# Patient Record
Sex: MALE | Race: BLACK OR AFRICAN AMERICAN | NOT HISPANIC OR LATINO | URBAN - METROPOLITAN AREA
[De-identification: names, ages, dates, MRNs, and addresses within clinical notes are randomized per-mention and may not be internally consistent; named-entity substitution may affect disease eponyms.]

---

## 2019-07-20 ENCOUNTER — INPATIENT (INPATIENT)
Facility: HOSPITAL | Age: 34
LOS: 4 days | Discharge: HOME | DRG: 682 | End: 2019-07-25
Attending: INTERNAL MEDICINE | Admitting: INTERNAL MEDICINE
Payer: MEDICARE

## 2019-07-20 VITALS
HEART RATE: 119 BPM | TEMPERATURE: 98 F | OXYGEN SATURATION: 96 % | SYSTOLIC BLOOD PRESSURE: 160 MMHG | RESPIRATION RATE: 18 BRPM | WEIGHT: 179.9 LBS | DIASTOLIC BLOOD PRESSURE: 116 MMHG

## 2019-07-20 LAB
ALBUMIN SERPL ELPH-MCNC: 3.9 G/DL — SIGNIFICANT CHANGE UP (ref 3.4–5)
ALP SERPL-CCNC: 94 U/L — SIGNIFICANT CHANGE UP (ref 40–120)
ALT FLD-CCNC: 43 U/L — HIGH (ref 12–42)
ANION GAP SERPL CALC-SCNC: 15 MMOL/L — SIGNIFICANT CHANGE UP (ref 9–16)
ANION GAP SERPL CALC-SCNC: 20 MMOL/L — HIGH (ref 9–16)
APAP SERPL-MCNC: <2 UG/ML — LOW (ref 10–30)
APPEARANCE UR: CLEAR — SIGNIFICANT CHANGE UP
APTT BLD: 34.4 SEC — SIGNIFICANT CHANGE UP (ref 27.5–36.3)
AST SERPL-CCNC: 44 U/L — HIGH (ref 15–37)
BASOPHILS NFR BLD AUTO: 0.3 % — SIGNIFICANT CHANGE UP (ref 0–2)
BILIRUB SERPL-MCNC: 0.8 MG/DL — SIGNIFICANT CHANGE UP (ref 0.2–1.2)
BILIRUB UR-MCNC: ABNORMAL
BUN SERPL-MCNC: 36 MG/DL — HIGH (ref 7–23)
BUN SERPL-MCNC: 46 MG/DL — HIGH (ref 7–23)
CALCIUM SERPL-MCNC: 7.4 MG/DL — LOW (ref 8.5–10.5)
CALCIUM SERPL-MCNC: 9.8 MG/DL — SIGNIFICANT CHANGE UP (ref 8.5–10.5)
CHLORIDE SERPL-SCNC: 107 MMOL/L — SIGNIFICANT CHANGE UP (ref 96–108)
CHLORIDE SERPL-SCNC: 99 MMOL/L — SIGNIFICANT CHANGE UP (ref 96–108)
CK SERPL-CCNC: 535 U/L — HIGH (ref 39–308)
CO2 SERPL-SCNC: 19 MMOL/L — LOW (ref 22–31)
CO2 SERPL-SCNC: 20 MMOL/L — LOW (ref 22–31)
COLOR SPEC: YELLOW — SIGNIFICANT CHANGE UP
CREAT SERPL-MCNC: 1.6 MG/DL — HIGH (ref 0.5–1.3)
CREAT SERPL-MCNC: 2.38 MG/DL — HIGH (ref 0.5–1.3)
DIFF PNL FLD: ABNORMAL
EOSINOPHIL NFR BLD AUTO: 0.1 % — SIGNIFICANT CHANGE UP (ref 0–6)
ETHANOL SERPL-MCNC: <3 MG/DL — SIGNIFICANT CHANGE UP
GLUCOSE SERPL-MCNC: 147 MG/DL — HIGH (ref 70–99)
GLUCOSE SERPL-MCNC: 84 MG/DL — SIGNIFICANT CHANGE UP (ref 70–99)
GLUCOSE UR QL: NEGATIVE — SIGNIFICANT CHANGE UP
HCT VFR BLD CALC: 39.6 % — SIGNIFICANT CHANGE UP (ref 39–50)
HGB BLD-MCNC: 13.6 G/DL — SIGNIFICANT CHANGE UP (ref 13–17)
IMM GRANULOCYTES NFR BLD AUTO: 0.2 % — SIGNIFICANT CHANGE UP (ref 0–1.5)
INR BLD: 1.22 — HIGH (ref 0.88–1.16)
KETONES UR-MCNC: NEGATIVE — SIGNIFICANT CHANGE UP
LEUKOCYTE ESTERASE UR-ACNC: NEGATIVE — SIGNIFICANT CHANGE UP
LYMPHOCYTES # BLD AUTO: 6.8 % — LOW (ref 13–44)
MAGNESIUM SERPL-MCNC: 3.5 MG/DL — HIGH (ref 1.6–2.6)
MCHC RBC-ENTMCNC: 30.4 PG — SIGNIFICANT CHANGE UP (ref 27–34)
MCHC RBC-ENTMCNC: 34.3 G/DL — SIGNIFICANT CHANGE UP (ref 32–36)
MCV RBC AUTO: 88.6 FL — SIGNIFICANT CHANGE UP (ref 80–100)
MONOCYTES NFR BLD AUTO: 10.6 % — SIGNIFICANT CHANGE UP (ref 2–14)
NEUTROPHILS NFR BLD AUTO: 82 % — HIGH (ref 43–77)
NITRITE UR-MCNC: NEGATIVE — SIGNIFICANT CHANGE UP
PCP SPEC-MCNC: SIGNIFICANT CHANGE UP
PH UR: 6 — SIGNIFICANT CHANGE UP (ref 5–8)
PLATELET # BLD AUTO: 290 K/UL — SIGNIFICANT CHANGE UP (ref 150–400)
POTASSIUM SERPL-MCNC: 3.3 MMOL/L — LOW (ref 3.5–5.3)
POTASSIUM SERPL-MCNC: 4.2 MMOL/L — SIGNIFICANT CHANGE UP (ref 3.5–5.3)
POTASSIUM SERPL-SCNC: 3.3 MMOL/L — LOW (ref 3.5–5.3)
POTASSIUM SERPL-SCNC: 4.2 MMOL/L — SIGNIFICANT CHANGE UP (ref 3.5–5.3)
PROT SERPL-MCNC: 9.2 G/DL — HIGH (ref 6.4–8.2)
PROT UR-MCNC: 30 MG/DL
PROTHROM AB SERPL-ACNC: 13.6 SEC — HIGH (ref 10–12.9)
RBC # BLD: 4.47 M/UL — SIGNIFICANT CHANGE UP (ref 4.2–5.8)
RBC # FLD: 12.9 % — SIGNIFICANT CHANGE UP (ref 10.3–14.5)
SALICYLATES SERPL-MCNC: 1.8 MG/DL — LOW (ref 2.8–20)
SODIUM SERPL-SCNC: 139 MMOL/L — SIGNIFICANT CHANGE UP (ref 132–145)
SODIUM SERPL-SCNC: 141 MMOL/L — SIGNIFICANT CHANGE UP (ref 132–145)
SP GR SPEC: 1.02 — SIGNIFICANT CHANGE UP (ref 1–1.03)
TROPONIN I SERPL-MCNC: <0.017 NG/ML — LOW (ref 0.02–0.06)
TROPONIN I SERPL-MCNC: <0.017 NG/ML — LOW (ref 0.02–0.06)
TSH SERPL-MCNC: 1.09 UIU/ML — SIGNIFICANT CHANGE UP (ref 0.36–3.74)
UROBILINOGEN FLD QL: 4 E.U./DL
WBC # BLD: 10.6 K/UL — HIGH (ref 3.8–10.5)
WBC # FLD AUTO: 10.6 K/UL — HIGH (ref 3.8–10.5)

## 2019-07-20 PROCEDURE — 93010 ELECTROCARDIOGRAM REPORT: CPT

## 2019-07-20 PROCEDURE — 70450 CT HEAD/BRAIN W/O DYE: CPT | Mod: 26

## 2019-07-20 PROCEDURE — 99220: CPT | Mod: 25

## 2019-07-20 PROCEDURE — 71045 X-RAY EXAM CHEST 1 VIEW: CPT | Mod: 26

## 2019-07-20 PROCEDURE — 36000 PLACE NEEDLE IN VEIN: CPT

## 2019-07-20 RX ORDER — SODIUM CHLORIDE 9 MG/ML
1000 INJECTION, SOLUTION INTRAVENOUS
Refills: 0 | Status: DISCONTINUED | OUTPATIENT
Start: 2019-07-20 | End: 2019-07-21

## 2019-07-20 RX ORDER — SODIUM CHLORIDE 9 MG/ML
2000 INJECTION INTRAMUSCULAR; INTRAVENOUS; SUBCUTANEOUS ONCE
Refills: 0 | Status: COMPLETED | OUTPATIENT
Start: 2019-07-20 | End: 2019-07-20

## 2019-07-20 RX ORDER — SODIUM CHLORIDE 9 MG/ML
1000 INJECTION INTRAMUSCULAR; INTRAVENOUS; SUBCUTANEOUS ONCE
Refills: 0 | Status: COMPLETED | OUTPATIENT
Start: 2019-07-20 | End: 2019-07-20

## 2019-07-20 RX ORDER — HYDRALAZINE HCL 50 MG
50 TABLET ORAL ONCE
Refills: 0 | Status: COMPLETED | OUTPATIENT
Start: 2019-07-20 | End: 2019-07-20

## 2019-07-20 RX ORDER — MIDAZOLAM HYDROCHLORIDE 1 MG/ML
5 INJECTION, SOLUTION INTRAMUSCULAR; INTRAVENOUS ONCE
Refills: 0 | Status: DISCONTINUED | OUTPATIENT
Start: 2019-07-20 | End: 2019-07-20

## 2019-07-20 RX ADMIN — SODIUM CHLORIDE 125 MILLILITER(S): 9 INJECTION, SOLUTION INTRAVENOUS at 23:32

## 2019-07-20 RX ADMIN — MIDAZOLAM HYDROCHLORIDE 5 MILLIGRAM(S): 1 INJECTION, SOLUTION INTRAMUSCULAR; INTRAVENOUS at 23:05

## 2019-07-20 RX ADMIN — Medication 50 MILLIGRAM(S): at 22:56

## 2019-07-20 RX ADMIN — SODIUM CHLORIDE 1000 MILLILITER(S): 9 INJECTION INTRAMUSCULAR; INTRAVENOUS; SUBCUTANEOUS at 19:34

## 2019-07-20 RX ADMIN — SODIUM CHLORIDE 1000 MILLILITER(S): 9 INJECTION INTRAMUSCULAR; INTRAVENOUS; SUBCUTANEOUS at 19:46

## 2019-07-20 RX ADMIN — SODIUM CHLORIDE 2000 MILLILITER(S): 9 INJECTION INTRAMUSCULAR; INTRAVENOUS; SUBCUTANEOUS at 22:00

## 2019-07-20 RX ADMIN — SODIUM CHLORIDE 1000 MILLILITER(S): 9 INJECTION INTRAMUSCULAR; INTRAVENOUS; SUBCUTANEOUS at 18:46

## 2019-07-20 NOTE — ED ADULT TRIAGE NOTE - CHIEF COMPLAINT QUOTE
patient was found wandering into a hotel lobby "acting weird, confused" pt with history is psychiatric problems, dm and htn as per EMS. FS in the field 295. Pt is awake and alert with no obvious injury or trauma- unknown onset of time

## 2019-07-20 NOTE — ED ADULT NURSE NOTE - NSIMPLEMENTINTERV_GEN_ALL_ED
Implemented All Fall Risk Interventions:  [place] to call system. Call bell, personal items and telephone within reach. Instruct patient to call for assistance. Room bathroom lighting operational. Non-slip footwear when patient is off stretcher. Physically safe environment: no spills, clutter or unnecessary equipment. Stretcher in lowest position, wheels locked, appropriate side rails in place. Provide visual cue, wrist band, yellow gown, etc. Monitor gait and stability. Monitor for mental status changes and reorient to person, place, and time. Review medications for side effects contributing to fall risk. Reinforce activity limits and safety measures with patient and family.

## 2019-07-20 NOTE — ED ADULT NURSE NOTE - OBJECTIVE STATEMENT
brought in by EMS for AMS and high blood sugar; tartive diskinesthia noted; known psych history but denies taking any medication, drug or alcohol use; denies any pain or have any medical complaints

## 2019-07-20 NOTE — ED PROVIDER NOTE - PHYSICAL EXAMINATION
VITAL SIGNS: I have reviewed nursing notes and confirm.  CONSTITUTIONAL: +Lip smacking c/w tardive dyskinesia.   SKIN: Skin is warm and dry, no acute rash, lacerations, abrasions, or bruising.   HEAD: Normocephalic; atraumatic.  EYES: PERRL, EOM intact; conjunctiva and sclera clear.  ENT: No nasal discharge; airway clear.  NECK: Supple; non tender.  CARD: +Tachycardia, no murmurs. Regular.   RESP: No wheezes, rales or rhonchi.  ABD: Normal bowel sounds; soft; non-distended; non-tender; no hepatosplenomegaly.  MSK: Normal ROM. No clubbing, cyanosis or edema.  NEURO: Alert but unable to assess orientation.  Moving all 4 extremities equally.   PSYCH: See HPI

## 2019-07-20 NOTE — ED CDU PROVIDER INITIAL DAY NOTE - PROGRESS NOTE DETAILS
Case signed out to Dr. Whitney at midnight. Pt resting comfortably.  Noted elevated BUN/Cr.  Will administer 3L IV hydration and re-evaluate and re-do labs.

## 2019-07-20 NOTE — ED PROVIDER NOTE - PMH
Diabetes mellitus    Psychiatric disorder Diabetes mellitus    Essential hypertension    Psychiatric disorder

## 2019-07-20 NOTE — ED PROVIDER NOTE - PROGRESS NOTE DETAILS
Pt taken to CT and all of the sudden became verbal and refused studies.  Now speaking and denies complaints reporting he's "fine."  Refusing any radiologic study.  Will observe while we await blood tests and re-evaluate.  Lip smacking also abated. Pt resting comfortably.  Noted elevated BUN/Cr.  Will administer 3L IV hydration and re-evaluate and re-do labs. BUN/Cr improved with fluid.  BS normal.  pt is still not giving full hx.  Will place on maintenance fluid and re-order HCT and CXR.  Will treat HTN.

## 2019-07-20 NOTE — ED PROVIDER NOTE - OBJECTIVE STATEMENT
Hx obtained from EMS as pt is alert and will make eye contact and follow directions but he is not answering questions after multiple attempts.  EMS reports h/o DM and psychiatric d/o as per their EMR.  They were called by a hotel after staff noted that pt wondered in from street and was acting in a bizarre fashion.  They deny any witnessed trauma or injuries.  Pt not answering questions for them either.

## 2019-07-21 DIAGNOSIS — R63.8 OTHER SYMPTOMS AND SIGNS CONCERNING FOOD AND FLUID INTAKE: ICD-10-CM

## 2019-07-21 DIAGNOSIS — F99 MENTAL DISORDER, NOT OTHERWISE SPECIFIED: ICD-10-CM

## 2019-07-21 DIAGNOSIS — F05 DELIRIUM DUE TO KNOWN PHYSIOLOGICAL CONDITION: ICD-10-CM

## 2019-07-21 DIAGNOSIS — Z91.89 OTHER SPECIFIED PERSONAL RISK FACTORS, NOT ELSEWHERE CLASSIFIED: ICD-10-CM

## 2019-07-21 DIAGNOSIS — E87.6 HYPOKALEMIA: ICD-10-CM

## 2019-07-21 DIAGNOSIS — I16.1 HYPERTENSIVE EMERGENCY: ICD-10-CM

## 2019-07-21 DIAGNOSIS — N17.9 ACUTE KIDNEY FAILURE, UNSPECIFIED: ICD-10-CM

## 2019-07-21 DIAGNOSIS — E11.9 TYPE 2 DIABETES MELLITUS WITHOUT COMPLICATIONS: ICD-10-CM

## 2019-07-21 DIAGNOSIS — R41.82 ALTERED MENTAL STATUS, UNSPECIFIED: ICD-10-CM

## 2019-07-21 LAB
ALBUMIN SERPL ELPH-MCNC: 3.3 G/DL — SIGNIFICANT CHANGE UP (ref 3.3–5)
ALP SERPL-CCNC: 69 U/L — SIGNIFICANT CHANGE UP (ref 40–120)
ALT FLD-CCNC: 30 U/L — SIGNIFICANT CHANGE UP (ref 10–45)
ANION GAP SERPL CALC-SCNC: 12 MMOL/L — SIGNIFICANT CHANGE UP (ref 9–16)
ANION GAP SERPL CALC-SCNC: 14 MMOL/L — SIGNIFICANT CHANGE UP (ref 5–17)
AST SERPL-CCNC: 26 U/L — SIGNIFICANT CHANGE UP (ref 10–40)
BILIRUB DIRECT SERPL-MCNC: <0.2 MG/DL — SIGNIFICANT CHANGE UP (ref 0–0.2)
BILIRUB INDIRECT FLD-MCNC: >0.3 MG/DL — SIGNIFICANT CHANGE UP (ref 0.2–1)
BILIRUB SERPL-MCNC: 0.5 MG/DL — SIGNIFICANT CHANGE UP (ref 0.2–1.2)
BUN SERPL-MCNC: 20 MG/DL — SIGNIFICANT CHANGE UP (ref 7–23)
BUN SERPL-MCNC: 30 MG/DL — HIGH (ref 7–23)
CALCIUM SERPL-MCNC: 8.2 MG/DL — LOW (ref 8.5–10.5)
CALCIUM SERPL-MCNC: 8.4 MG/DL — SIGNIFICANT CHANGE UP (ref 8.4–10.5)
CHLORIDE SERPL-SCNC: 105 MMOL/L — SIGNIFICANT CHANGE UP (ref 96–108)
CHLORIDE SERPL-SCNC: 109 MMOL/L — HIGH (ref 96–108)
CK SERPL-CCNC: 298 U/L — HIGH (ref 30–200)
CO2 SERPL-SCNC: 21 MMOL/L — LOW (ref 22–31)
CO2 SERPL-SCNC: 24 MMOL/L — SIGNIFICANT CHANGE UP (ref 22–31)
CREAT SERPL-MCNC: 1.05 MG/DL — SIGNIFICANT CHANGE UP (ref 0.5–1.3)
CREAT SERPL-MCNC: 1.61 MG/DL — HIGH (ref 0.5–1.3)
GLUCOSE BLDC GLUCOMTR-MCNC: 102 MG/DL — HIGH (ref 70–99)
GLUCOSE BLDC GLUCOMTR-MCNC: 142 MG/DL — HIGH (ref 70–99)
GLUCOSE BLDC GLUCOMTR-MCNC: 85 MG/DL — SIGNIFICANT CHANGE UP (ref 70–99)
GLUCOSE SERPL-MCNC: 108 MG/DL — HIGH (ref 70–99)
GLUCOSE SERPL-MCNC: 119 MG/DL — HIGH (ref 70–99)
HCT VFR BLD CALC: 46.4 % — SIGNIFICANT CHANGE UP (ref 39–50)
HGB BLD-MCNC: 14.5 G/DL — SIGNIFICANT CHANGE UP (ref 13–17)
LACTATE SERPL-SCNC: 2.1 MMOL/L — HIGH (ref 0.4–2)
MAGNESIUM SERPL-MCNC: 2.2 MG/DL — SIGNIFICANT CHANGE UP (ref 1.6–2.6)
MCHC RBC-ENTMCNC: 30 PG — SIGNIFICANT CHANGE UP (ref 27–34)
MCHC RBC-ENTMCNC: 31.3 GM/DL — LOW (ref 32–36)
MCV RBC AUTO: 96.1 FL — SIGNIFICANT CHANGE UP (ref 80–100)
NRBC # BLD: 0 /100 WBCS — SIGNIFICANT CHANGE UP (ref 0–0)
PCO2 BLDV: 52 MMHG — HIGH (ref 41–51)
PH BLDV: 7.32 — SIGNIFICANT CHANGE UP (ref 7.32–7.43)
PHOSPHATE SERPL-MCNC: 2.8 MG/DL — SIGNIFICANT CHANGE UP (ref 2.5–4.5)
PLATELET # BLD AUTO: 290 K/UL — SIGNIFICANT CHANGE UP (ref 150–400)
PO2 BLDV: 30 MMHG — LOW (ref 35–40)
POTASSIUM SERPL-MCNC: 3.7 MMOL/L — SIGNIFICANT CHANGE UP (ref 3.5–5.3)
POTASSIUM SERPL-MCNC: SIGNIFICANT CHANGE UP MMOL/L (ref 3.5–5.3)
POTASSIUM SERPL-SCNC: 3.7 MMOL/L — SIGNIFICANT CHANGE UP (ref 3.5–5.3)
POTASSIUM SERPL-SCNC: SIGNIFICANT CHANGE UP MMOL/L (ref 3.5–5.3)
PROT SERPL-MCNC: 6.4 G/DL — SIGNIFICANT CHANGE UP (ref 6–8.3)
RBC # BLD: 4.83 M/UL — SIGNIFICANT CHANGE UP (ref 4.2–5.8)
RBC # FLD: 13 % — SIGNIFICANT CHANGE UP (ref 10.3–14.5)
SAO2 % BLDV: 47 % — SIGNIFICANT CHANGE UP
SODIUM SERPL-SCNC: 140 MMOL/L — SIGNIFICANT CHANGE UP (ref 135–145)
SODIUM SERPL-SCNC: 145 MMOL/L — SIGNIFICANT CHANGE UP (ref 132–145)
WBC # BLD: 11.6 K/UL — HIGH (ref 3.8–10.5)
WBC # FLD AUTO: 11.6 K/UL — HIGH (ref 3.8–10.5)

## 2019-07-21 PROCEDURE — 99223 1ST HOSP IP/OBS HIGH 75: CPT

## 2019-07-21 PROCEDURE — 99217: CPT

## 2019-07-21 PROCEDURE — 99223 1ST HOSP IP/OBS HIGH 75: CPT | Mod: GC

## 2019-07-21 PROCEDURE — 93010 ELECTROCARDIOGRAM REPORT: CPT

## 2019-07-21 RX ORDER — HYDRALAZINE HCL 50 MG
10 TABLET ORAL EVERY 4 HOURS
Refills: 0 | Status: DISCONTINUED | OUTPATIENT
Start: 2019-07-21 | End: 2019-07-21

## 2019-07-21 RX ORDER — HEPARIN SODIUM 5000 [USP'U]/ML
5000 INJECTION INTRAVENOUS; SUBCUTANEOUS EVERY 8 HOURS
Refills: 0 | Status: DISCONTINUED | OUTPATIENT
Start: 2019-07-21 | End: 2019-07-23

## 2019-07-21 RX ORDER — LABETALOL HCL 100 MG
10 TABLET ORAL ONCE
Refills: 0 | Status: COMPLETED | OUTPATIENT
Start: 2019-07-21 | End: 2019-07-21

## 2019-07-21 RX ORDER — HYDRALAZINE HCL 50 MG
10 TABLET ORAL ONCE
Refills: 0 | Status: COMPLETED | OUTPATIENT
Start: 2019-07-21 | End: 2019-07-21

## 2019-07-21 RX ADMIN — Medication 10 MILLIGRAM(S): at 02:09

## 2019-07-21 RX ADMIN — Medication 1 MILLIGRAM(S): at 11:14

## 2019-07-21 RX ADMIN — Medication 2.5 MILLIGRAM(S): at 05:16

## 2019-07-21 RX ADMIN — Medication 20 MILLIGRAM(S): at 11:15

## 2019-07-21 RX ADMIN — Medication 10 MILLIGRAM(S): at 04:41

## 2019-07-21 NOTE — BEHAVIORAL HEALTH ASSESSMENT NOTE - SUMMARY
The patient is a 33-year old man with a self reported history of schizoaffective disorder, who presented to [facility] from [facility] with altered mental status and labile hypertension. Initially uncooperative, patient's vital signs were treated and he is now admitted for further medical treatment of unstable vital signs and EMILI. Psychiatry consulted to rule out catatonia, after patient was minimally verbal and lethargic this morning. Ativan 1mg IV was given prior to psychiatric assessment.     On examination, the patient is alert, oriented, does not show any echopraxia/echolalia, no posturing, is not mute, is not hyperaroused, is mobile, shows no frontal release signs, is constricted in affect and a bit withdrawn, but shows good eye contact. He is somewhat tangential but not overly disorganized. There is low concern for catatonia as he scores only 2 on the Don-Brandon Catatonia rating scale. There is also low concern for neuroleptic malignant syndrome at this time, as his WBC and CPK are not severely elevated and he is afebrile. He has no rigidity on examination, and has also been noncompliant with neuroleptic medication.     It is possible that the patient's presentation this morning was a mild form of catatonia, which responded well to ativan, although it is less likely. He may have been exhibiting signs of a hypoactive delirium, evident by lethargy, inattentiveness, hypoarousal, and disorientation. This delirium may have been due to multiple etiologies. It is also possible that the patient may have been in alcohol withdrawal, with abnormal vital signs and altered mental status, which has improved with ativan.     The patient does have a psychiatric history, and a clear substance use disorder. While he does not meet criteria for inpatient psychiatric hospitalization at this time, he would benefit from outpatient referrals and treatment.    At this time, he is not acutely psychotic, not acutely suicidal, and fairly well oriented. He is, however, inattentive, mildly confused, and mildly lethargic, pointing most towards a hypoactive delirium. His psychiatric symptoms are also likely exacerbated by heavy substance use. While patient is still in the hospital, would monitor for s/s of alcohol withdrawal using CIWA, and treat as appropriate.     Plan:   1. Patient does NOT need 1:1  2. Patient does not require inpatient hospitalization  3. Would continue to treat unstable vital signs, encourage adequate PO hydration  4. Hold neuroleptic medications at this time due to concern for catatonia  5. Keep blinds open during day, frequent reorientation techniques, clock visible in room  6. Monitor for alcohol withdrawal  7. Would enlist help of Social Work for shelter referral, community resources    Patient does not have any psychiatric contraindications to discharge. Should he be medically cleared, patient should be given number [phone] include [facility], a free hot[facility] that gives resources to those in need of mental health.     For outpatient psychiatric follow up, patient can go to [facility] mental health clinic on [address], any day at 8:30am to establish psychiatric care. Patient can also follow up at [facility], which provides dual diagnosis mental health care.

## 2019-07-21 NOTE — PROGRESS NOTE ADULT - ATTENDING COMMENTS
33-year old man with a self reported history of schizoaffective disorder with uncontrolled hypertension with EMILI (improved with hydration) with acute encephalopathy due to possible catatonia (improved with ativan). History of alcohol abuse. Plan for CIWA protocol. Stable for now. Psychiatry to follow. May be transferred to floor.

## 2019-07-21 NOTE — H&P ADULT - PROBLEM SELECTOR PLAN 7
1) PCP Contacted on Admission: (Y/N) --> Name & Phone #:  2) Date of Contact with PCP:  3) PCP Contacted at Discharge: (Y/N)  4) Summary of Handoff Given to PCP:   5) Post-Discharge Appointment Date and Location: Fluids: D5   Electrolytes: Replete as necessary   Nutrition:  GI:  DVT:  Glucose:  Bowel:  Pain:  Dispo: [facility]  Code: Full

## 2019-07-21 NOTE — PROGRESS NOTE ADULT - PROBLEM SELECTOR PLAN 3
AMS likely 2/2 to psychiatric state vs. neuro pathology.   - control BP management as below  - does not meet sepsis criteria   - UTox negative   - Psychiatry on board. Appreciate recs. No neuroleptics now.

## 2019-07-21 NOTE — BEHAVIORAL HEALTH ASSESSMENT NOTE - DIFFERENTIAL
Delirium, hypoactive state, due to multiple etiologies  Alcohol use Disorder  r/o Catatonia  r/o NMS

## 2019-07-21 NOTE — H&P ADULT - ASSESSMENT
32 y/o male with unknown psychiatric history brought in by EMS for AMS and hypertensive emrgency with labile hypertension (160-200/112-116), unresponsive to PO and IV hydralzine.

## 2019-07-21 NOTE — PROGRESS NOTE ADULT - PROBLEM SELECTOR PLAN 1
Unresponsive HTN with EMILI and AMS. -BP reportedly in 200s (highest documented 181/125) with EMILI and AMS. s/p Hydralazine 50mg PO x 1, and Hydralazine 10mg IV push x 1  - trops negative, EKG   - treat with prn dose of IV anti-hypertensives (Enalapril, labetalol hydralazine)   - f/u EKG  - obtain formal echocardiogram  - Enalapril 20mg QD with good response. Last  / 85

## 2019-07-21 NOTE — H&P ADULT - PROBLEM SELECTOR PLAN 4
On presentation BUN/Cr 46/2.38, slowly trending down s/p 2L NS and 1 L D5/0.5NS On presentation BUN/Cr 46/2.38, slowly trending down s/p 2L NS and 1 L D5/0.5NS. Currently 30/1/61. Likely to resolve with fluids and appropriate BP control.   -c/w NS   -replete K, as necessary  -repeat Utox in AM On presentation BUN/Cr 46/2.38, slowly trending down s/p 3L NS and maintenance D5/0.5NS. Currently 30/1.61. Likely to resolve with fluids and appropriate BP control.   - consider weaning off fluids when intaking PO  - f/u AM labs

## 2019-07-21 NOTE — PROGRESS NOTE ADULT - SUBJECTIVE AND OBJECTIVE BOX
Hospital course:  34yo man with unknown PMHx presented to ED with hypertensive emergency and AMS. In ED patient was given 50mg hydralazine, 10mg labetalol. On 7LA patient was given 20mg Enalpril standing order with good response in BP. Also, catatonia was entertained as possible reason for AMS. 1mg Ativan IV push was given and patient became responsive almost immediately afterwards. Psychiatry consulted and revealed patient with history of schizoaffective disorder and recs CIWA protocol, hold off neuroleptics in light of possible catatonia, and safe for discharge. The patient deemed safe for transfer to regional medical floor.       Interval / Overnight:  Patient was admitted overnight 2 to AMS and hypertensive emergency. When I spoke with Mr. Francis this morning he was minimally responsive, but appeared alert. The one time he did speak he said "I don't know if this is reality or not".     VITAL SIGNS:  Vital Signs Last 24 Hrs  T(C): 36.6 (2019 14:26), Max: 37.5 (2019 19:42)  T(F): 97.9 (2019 14:26), Max: 99.5 (2019 19:42)  HR: 72 (2019 17:18) (72 - 119)  BP: 147/85 (2019 17:18) (127/75 - 193/116)  BP(mean): 107 (2019 17:18) (96 - 140)  RR: 16 (2019 17:18) (16 - 18)  SpO2: 98% (2019 17:18) (95% - 100%)    PHYSICAL EXAM:    General: in NAD, staring vacantly  HEENT: normocephalic, atraumatic; PERRL, anicteric sclera; MMM. Poor dentition. Right lower lip lesion, crusted.   Neck: supple, no thyromegaly, no lymphadenopathy  Cardiovascular: +S1/S2, RRR, no M/G/R  Respiratory: clear to auscultation B/L; no wheezing, no rales, no rhonchi  Gastrointestinal: soft, NT/ND; +BSx4, no organomegaly  Extremities: WWP; no edema, clubbing or cyanosis  Vascular: 2+ radial, DP/PT pulses B/L  Neurological: patient was unresponsive to questions. Pupils were equal and reactive. Extraoccular eye movements were of full range and symmetric. Ptosis of right eyelid.   psych: Patient is unresponsive. Blinking rapidly. Malodorous and appears confused / vacant.      MEDICATIONS:  MEDICATIONS  (STANDING):  enalapril 20 milliGRAM(s) Oral daily  enalaprilat Injectable 2.5 milliGRAM(s) IV Push every 6 hours  heparin  Injectable 5000 Unit(s) SubCutaneous every 8 hours    MEDICATIONS  (PRN):  LORazepam   Injectable 2 milliGRAM(s) IV Push every 2 hours PRN CIWA-Ar score increase by 2 points and a total score of 7 or less      ALLERGIES:  Allergies    No Known Allergies    Intolerances        LABS:                        14.5   11.60 )-----------( 290      ( 2019 11:06 )             46.4     07-    140  |  105  |  20  ----------------------------<  108<H>  see note   |  21<L>  |  1.05    Ca    8.4      2019 11:06  Phos  2.8     -  Mg     2.2         TPro  6.4  /  Alb  3.3  /  TBili  0.5  /  DBili  <0.2  /  AST  26  /  ALT  30  /  AlkPhos  69  07-    PT/INR - ( 2019 18:45 )   PT: 13.6 sec;   INR: 1.22          PTT - ( 2019 18:45 )  PTT:34.4 sec  Urinalysis Basic - ( 2019 23:18 )    Color: Yellow / Appearance: Clear / S.025 / pH: x  Gluc: x / Ketone: NEGATIVE  / Bili: Small / Urobili: 4.0 E.U./dL   Blood: x / Protein: 30 mg/dL / Nitrite: NEGATIVE   Leuk Esterase: NEGATIVE / RBC: < 5 /HPF / WBC < 5 /HPF   Sq Epi: x / Non Sq Epi: x / Bacteria: Many /HPF      CAPILLARY BLOOD GLUCOSE      POCT Blood Glucose.: 85 mg/dL (2019 17:01)      RADIOLOGY & ADDITIONAL TESTS: Reviewed.

## 2019-07-21 NOTE — H&P ADULT - PROBLEM SELECTOR PLAN 2
Patient brought in by EMS for AMS and disruption. Patient refusing to comply in ED or respond to question. Utox neg. CTH neg.   -psych consult in AM   -repeat urine tox   -s/p 3L fluid Possibly secondary to HTN vs unknown substance ingestion vs psychiatric hx.  Patient brought in by EMS for AMS and disruption. Patient refusing to comply in ED or respond to question. Utox neg. CTH neg. Patient unresponsive on admission.   -psych consult in AM   -consider repeat urine tox

## 2019-07-21 NOTE — PROGRESS NOTE ADULT - PROBLEM SELECTOR PLAN 6
Fluids: D5   Electrolytes: Replete as necessary   Nutrition:  GI:  DVT:  Glucose:  Bowel:  Pain:  Dispo: [facility]  Code: Full

## 2019-07-21 NOTE — H&P ADULT - NSHPLABSRESULTS_GEN_ALL_CORE
.  LABS:                         13.6   10.6  )-----------( 290      ( 2019 18:45 )             39.6     07-21    145  |  109<H>  |  30<H>  ----------------------------<  119<H>  3.7   |  24  |  1.61<H>    Ca    8.2<L>      2019 02:22  Mg     3.5         TPro  9.2<H>  /  Alb  3.9  /  TBili  0.8  /  DBili  x   /  AST  44<H>  /  ALT  43<H>  /  AlkPhos  94  07-20    PT/INR - ( 2019 18:45 )   PT: 13.6 sec;   INR: 1.22          PTT - ( 2019 18:45 )  PTT:34.4 sec  Urinalysis Basic - ( 2019 23:18 )    Color: Yellow / Appearance: Clear / S.025 / pH: x  Gluc: x / Ketone: NEGATIVE  / Bili: Small / Urobili: 4.0 E.U./dL   Blood: x / Protein: 30 mg/dL / Nitrite: NEGATIVE   Leuk Esterase: NEGATIVE / RBC: < 5 /HPF / WBC < 5 /HPF   Sq Epi: x / Non Sq Epi: x / Bacteria: Many /HPF      CARDIAC MARKERS ( 2019 23:06 )  x     / x     / 535 U/L / x     / x      CARDIAC MARKERS ( 2019 22:10 )  <0.017 ng/mL / x     / x     / x     / x      CARDIAC MARKERS ( 2019 18:45 )  <0.017 ng/mL / x     / x     / x     / x            Lactate, Blood: 2.1 mmoL/L ( @ 02:29)      RADIOLOGY, EKG & ADDITIONAL TESTS: Reviewed.

## 2019-07-21 NOTE — BEHAVIORAL HEALTH ASSESSMENT NOTE - NSBHCONSULTSUBSTANCEALCOHOL_PSY_A_CORE FT
Ativan 2mg PO/IM/IV PRN signs of alcohol withdrawal, including autonomic instability, SBP >110, Pulse>100, tremors

## 2019-07-21 NOTE — H&P ADULT - NSHPPHYSICALEXAM_GEN_ALL_CORE
.  VITAL SIGNS:  T(C): 36.7 (07-20-19 @ 21:23), Max: 37.5 (07-20-19 @ 19:42)  T(F): 98.1 (07-20-19 @ 21:23), Max: 99.5 (07-20-19 @ 19:42)  HR: 72 (07-21-19 @ 05:02) (72 - 119)  BP: 171/112 (07-21-19 @ 05:02) (157/90 - 193/116)  BP(mean): 137 (07-21-19 @ 05:02) (137 - 140)  RR: 17 (07-21-19 @ 05:02) (16 - 18)  SpO2: 99% (07-21-19 @ 05:02) (95% - 100%)  Wt(kg): --    PHYSICAL EXAM: Patient unwilling to comply with complete physical exam     Constitutional: obese, apathetic male resting in bed, unresponsive to instruction   Head: PERRL,  clear conjunctiva, MMM, right lower lip abrasion; MMM  Neck: supple; no JVD   Respiratory: unable to assess  Cardiac: +S1/S2; RRR; no M/R/G  Gastrointestinal: soft, NT/ND; no rebound or guarding; +BSx4  Extremities: no clubbing or cyanosis; no peripheral edema  Musculoskeletal: NROM x4; no joint swelling, tenderness or erythema  Vascular: 2+ radial, femoral, DP/PT pulses B/L  Psychiatric: apathetic affect, non-verbal, unresponsive

## 2019-07-21 NOTE — BEHAVIORAL HEALTH ASSESSMENT NOTE - NSBHCHARTREVIEWLAB_PSY_A_CORE FT
14.5   11.60 )-----------( 290      ( 21 Jul 2019 11:06 )             46.4       07-21    140  |  105  |  20  ----------------------------<  108<H>  see note   |  21<L>  |  1.05    Ca    8.4      21 Jul 2019 11:06  Phos  2.8     07-21  Mg     2.2     07-21    TPro  9.2<H>  /  Alb  3.9  /  TBili  0.8  /  DBili  x   /  AST  44<H>  /  ALT  43<H>  /  AlkPhos  94  07-20

## 2019-07-21 NOTE — H&P ADULT - PROBLEM SELECTOR PLAN 6
Fluids: NS   Electrolytes: Replete as necessary   Nutrition:  GI:  DVT:  Glucose:  Bowel:  Pain:  Dispo: [facility]  Code: Full History of diabetes, as per EMS. Blood glucose range 84- 147. No ketones in urine.  -obtain collateral   -f/u finger sticks

## 2019-07-21 NOTE — PROGRESS NOTE ADULT - SUBJECTIVE AND OBJECTIVE BOX
Senior Resident Transfer Acceptance Note from [facility] to [facility]    34 y/o M with hx of unspecified psychiatric disorder presenting with bizarre behavior found to be in hypertensive emergency. Pt non-compliant and non-cooperative with medications being transferred from [facility] to [facility] for further evaluation and treatment of hypertensive emergency. Hx limited due to Senior Resident Transfer Acceptance Note from [facility] to [facility]  32 y/o M with hx of unspecified psychiatric disorder presenting with bizarre behavior found to be in hypertensive emergency. Pt non-compliant and non-cooperative with medications being transferred from [facility] to [facility] for further evaluation and treatment of hypertensive emergency. Hx limited due to being uncooperative with interview.       VITAL SIGNS:  Vital Signs Last 24 Hrs  T(C): 36.7 (2019 21:23), Max: 37.5 (2019 19:42)  T(F): 98.1 (2019 21:23), Max: 99.5 (2019 19:42)  HR: 94 (2019 03:41) (75 - 119)  BP: 166/101 (2019 03:41) (157/90 - 193/116)  BP(mean): --  RR: 17 (2019 03:41) (16 - 18)  SpO2: 98% (2019 03:41) (95% - 100%)    PHYSICAL EXAM:    General: WDWN, resting comfortably in bed   HEENT: NC/AT; PERRL, anicteric sclera; MMM  Neck: supple  Cardiovascular: +S1/S2; RRR  Respiratory: CTA B/L; no W/R/R, not using accessory muscles or nasal flarign   Gastrointestinal: soft, NT/ND; +BSx4  Extremities: WWP; no edema, clubbing or cyanosis  Vascular: 2+ radial, DP/PT pulses B/L  Neurological: AAOx3; no focal deficits    MEDICATIONS:  MEDICATIONS  (STANDING):  dextrose 5% + sodium chloride 0.45%. 1000 milliLiter(s) (125 mL/Hr) IV Continuous <Continuous>  enalaprilat Injectable 1.25 milliGRAM(s) IV Push once    MEDICATIONS  (PRN):      ALLERGIES:  Allergies    No Known Allergies    Intolerances        LABS:                        13.6   10.6  )-----------( 290      ( 2019 18:45 )             39.6     07-21    145  |  109<H>  |  30<H>  ----------------------------<  119<H>  3.7   |  24  |  1.61<H>    Ca    8.2<L>      2019 02:22  Mg     3.5     -20    TPro  9.2<H>  /  Alb  3.9  /  TBili  0.8  /  DBili  x   /  AST  44<H>  /  ALT  43<H>  /  AlkPhos  94  07-20    PT/INR - ( 2019 18:45 )   PT: 13.6 sec;   INR: 1.22          PTT - ( 2019 18:45 )  PTT:34.4 sec  Urinalysis Basic - ( 2019 23:18 )    Color: Yellow / Appearance: Clear / S.025 / pH: x  Gluc: x / Ketone: NEGATIVE  / Bili: Small / Urobili: 4.0 E.U./dL   Blood: x / Protein: 30 mg/dL / Nitrite: NEGATIVE   Leuk Esterase: NEGATIVE / RBC: < 5 /HPF / WBC < 5 /HPF   Sq Epi: x / Non Sq Epi: x / Bacteria: Many /HPF      CAPILLARY BLOOD GLUCOSE      POCT Blood Glucose.: 189 mg/dL (2019 18:21)      RADIOLOGY & ADDITIONAL TESTS: Reviewed. *INCOMPLETE*  Senior Resident Transfer Acceptance Note from [facility] to [facility]  32 y/o M with hx of unspecified psychiatric disorder presenting with bizarre behavior found to be in hypertensive emergency. Pt non-compliant and non-cooperative with medications being transferred from [facility] to [facility] for further evaluation and treatment of hypertensive emergency. Hx limited due to being uncooperative with interview.       VITAL SIGNS:  Vital Signs Last 24 Hrs  T(C): 36.7 (2019 21:23), Max: 37.5 (2019 19:42)  T(F): 98.1 (2019 21:23), Max: 99.5 (2019 19:42)  HR: 94 (2019 03:41) (75 - 119)  BP: 166/101 (2019 03:41) (157/90 - 193/116)  BP(mean): --  RR: 17 (2019 03:41) (16 - 18)  SpO2: 98% (2019 03:41) (95% - 100%)    PHYSICAL EXAM:    General: WDWN, resting comfortably in bed   HEENT: NC/AT; PERRL, anicteric sclera; MMM  Neck: supple  Cardiovascular: +S1/S2; RRR  Respiratory: CTA B/L; no W/R/R, not using accessory muscles or nasal flarign   Gastrointestinal: soft, NT/ND; +BSx4  Extremities: WWP; no edema, clubbing or cyanosis  Vascular: 2+ radial, DP/PT pulses B/L  Neurological: AAOx3; no focal deficits    MEDICATIONS:  MEDICATIONS  (STANDING):  dextrose 5% + sodium chloride 0.45%. 1000 milliLiter(s) (125 mL/Hr) IV Continuous <Continuous>  enalaprilat Injectable 1.25 milliGRAM(s) IV Push once    MEDICATIONS  (PRN):      ALLERGIES:  Allergies    No Known Allergies    Intolerances        LABS:                        13.6   10.6  )-----------( 290      ( 2019 18:45 )             39.6     07-21    145  |  109<H>  |  30<H>  ----------------------------<  119<H>  3.7   |  24  |  1.61<H>    Ca    8.2<L>      2019 02:22  Mg     3.5     -20    TPro  9.2<H>  /  Alb  3.9  /  TBili  0.8  /  DBili  x   /  AST  44<H>  /  ALT  43<H>  /  AlkPhos  94  07-20    PT/INR - ( 2019 18:45 )   PT: 13.6 sec;   INR: 1.22          PTT - ( 2019 18:45 )  PTT:34.4 sec  Urinalysis Basic - ( 2019 23:18 )    Color: Yellow / Appearance: Clear / S.025 / pH: x  Gluc: x / Ketone: NEGATIVE  / Bili: Small / Urobili: 4.0 E.U./dL   Blood: x / Protein: 30 mg/dL / Nitrite: NEGATIVE   Leuk Esterase: NEGATIVE / RBC: < 5 /HPF / WBC < 5 /HPF   Sq Epi: x / Non Sq Epi: x / Bacteria: Many /HPF      CAPILLARY BLOOD GLUCOSE      POCT Blood Glucose.: 189 mg/dL (2019 18:21)      RADIOLOGY & ADDITIONAL TESTS: Reviewed.

## 2019-07-21 NOTE — PROGRESS NOTE ADULT - ASSESSMENT
34 y/o male with unknown psychiatric history brought in by EMS for AMS and hypertensive emrgency with labile hypertension (160-200/112-116), unresponsive to PO and IV hydralzine. Now s/p 1mg IV ativan and responsive.

## 2019-07-21 NOTE — ED CDU PROVIDER DISPOSITION NOTE - CLINICAL COURSE
see HPI and initial OBS note    pt reevaluated after s/o during obs.  rebound hypertension noted, additional hydralazine given IV. added additional lab work including vbg, lactate, rpt bmp.  pt reevaluated about 3 hr after IM versed.  pt was easily arousable, would make eye contact but not answering questions.  tox screen is neg, pt had been on obs for ~9 hr, though still possible for novel drugs, less likely given duration of ams.  also reported psych history by EMS but no record available.  unable to clear medically for psych eval.  given labile BP, med stepdown consulted for medical admission, bp control, and inpatient psych consult once medically cleared.  d/w intensivist, accepted to stepdown, rec'd vasotec 1.25mg IV as pt not able to tolerate PO meds.

## 2019-07-21 NOTE — PROGRESS NOTE ADULT - PROBLEM SELECTOR PLAN 5
History of diabetes, as per EMS. Blood glucose range 84- 147. No ketones in urine.  -obtain collateral   -f/u finger sticks

## 2019-07-21 NOTE — H&P ADULT - PROBLEM SELECTOR PLAN 5
History of diabetes, as per EMS. Blood glucose range 84- 147. No ketones in urine.  -obtain collateral  -f/u finger sticks Likely secondary to dec PO intake. K on admission 3.3, improved with 3L NS.   -replete as necessary until k>4  -f/u BMP

## 2019-07-21 NOTE — H&P ADULT - PROBLEM SELECTOR PLAN 1
Upon arrival to [facility], reportedly -200/112-116. Received 3L of fluid, BP unresponsive to PO hydralazine 50mg and 10mg IV push.  -ED EKG NSR  -repeat EKG  - Unresponsive HTN with EMILI and AMS. -BP reportedly in 200s (highest documented 181/125) with EMILI and AMS. s/p Hydralazine 50mg PO x 1, and Hydralazine 10mg IV push x 1  - trops negative, EKG   - pt refusing to take PO medication  - treat with prn dose of IV anti-hypertensives (Enalapril, labetalol hydralazine)   - f/u EKG  - obtain formal echocardiogram

## 2019-07-21 NOTE — H&P ADULT - HISTORY OF PRESENT ILLNESS
33 year old male with significant unspecified psychiatric history admitted to  from [facility] due to altered mental status and labile hypertension. Patient not cooperative, not answering questions, but  non-combative. History obtained from EMS and previous medical records. Patient was found by hotel staff with altered mental status, yelling and acting bizarre. At [facility] ED, patient hypertensive to 160-181/112-125. EKG – normal sinus rhythm. Patient complied with 50 mg PO hydralzine. Then refused PO. Received 10 mg IV push hydralazine x2. Given 5mg Versed for sedation and CT head performed. CTH negative. Utox negative. Of note, patient found to have EMILI on arrival and electrolyte imbalances (K+ decreased), patient was repleted accordingly with 2L NS and 1L D5/.5NS. Patient observed in in ED for 9 hours and admitted to [address] due to AMS and hypertensive emergency and labile hypertension with unwillingness to comply.   Upon arrival to [address], vitals as follows: T: Afebrile | HR: 80-84bpm | BP: 68/36-76/40mmHg after 3L NS at [facility]- started on peripheral levophed, 94/55mmHg | RR: 18-22/min | SpO2: % on ventilator. Labs significant for: 33 year old male with significant unspecified psychiatric history admitted to  from [facility] due to altered mental status and labile hypertension. Patient not cooperative, not answering questions, but  non-combative. History obtained from EMS and previous medical records. Patient was found by hotel staff with altered mental status, yelling and acting bizarre. At [facility] ED, patient hypertensive to 160-181/112-125. EKG – normal sinus rhythm. Patient complied with 50 mg PO hydralzine. Then refused PO. Received 10 mg IV push hydralazine x2. Given 5mg Versed for sedation and CT head performed. CTH negative. Utox negative. Of note, patient found to have EMILI on arrival and electrolyte imbalances (K+ decreased), patient was repleted accordingly with 2L NS and 1L D5/.5NS. Patient observed in in ED for 9 hours and admitted to [address] due to AMS and hypertensive emergency and labile hypertension with unwillingness to comply.   At [facility], vitals as follows: T: Afebrile | HR: 80-84bpm | BP: 68/36-76/40mmHg after 3L NS at [facility]- started on peripheral levophed, 94/55mmHg | RR: 18-22/min | SpO2: % on ventilator. Labs significant for: BUN: 46; Cr: 2.38; K: 3.3; Lactate 2.1    Upon arrival to [address], vitals as follows: T: Afebrile | HR: 80-84bpm | BP: 68/36-76/40mmHg after 3L NS at [facility]- started on peripheral levophed, 94/55mmHg | RR: 18-22/min | SpO2: % on ventilator. Labs significant for: 33 year old male with significant unspecified psychiatric history admitted to  from [facility] due to altered mental status and labile hypertension. Patient not cooperative, not answering questions, but  non-combative. History obtained from EMS and previous medical records. Patient was found by hotel staff with altered mental status, yelling and acting bizarre. At [facility] ED, patient hypertensive to 160-181/112-125. EKG – normal sinus rhythm. Patient complied with 50 mg PO hydralzine. Then refused PO. Received 10 mg IV push hydralazine x2. Given 5mg Versed for sedation and CT head performed. CTH negative. Utox negative. Of note, patient found to have EMILI on arrival, patient was repleted accordingly with 2L NS and 1L D5/.5NS. Patient observed in in ED for 9 hours and admitted to [address] due to AMS and hypertensive emergency with unwillingness to comply.   Upon arrival, patient responsive to nurses to ask if is he still in [facility]. However, unwilling to respond to further questioning or comply with exam or instructions. 	  At [facility], vitals as follows: T: Afebrile | HR: 75-119bpm | BP: 157//125mmHg; received 3L NS at [facility]| RR: 17-18/min | SpO2: % on RA. Labs significant for: BUN: 46; Cr: 2.38; K: 3.3; Lactate 2.1  Upon arrival to [address], vitals as follows: T: Afebrile | HR: 72-88bpm | BP: 171-181/107-113mmHg  | RR: 16-17/min | SpO2: 99% on RA. EKG: nonspecific T-wave inversions in all leads.

## 2019-07-21 NOTE — PROGRESS NOTE ADULT - ASSESSMENT
32 y/o M with hx of unspecified psychiatric disorder presenting with bizarre behavior found to be in hypertensive emergency admitted to tele unit in the setting of uncooperativeness with medication administration      # Altered Mental Status / Bizarre behavior   r/o Toxic Metabolic Encephelopathy. AMS can be likely in the setting of his psychiatric disorder vs HTN emergency vs unspecified substance ingestion    - control BP management as below  - does not meet sepsis criteria   - UTox negative   - Psychiatry consult in AM  - will try to obtain more collateral if mental status improves    #HTN emergency  BP reportedly in 200s (highest documented 181/125) with EMILI and AMS   - pt refusing to take PO medication  - treat with prn dose of IV anti-hypertensives (Enalapril, labetalol hydralazine)   - f/u EKG  - obtain formal echocardiogram 34 y/o M with hx of unspecified psychiatric disorder presenting with bizarre behavior found to be in hypertensive emergency admitted to tele unit in the setting of uncooperativeness with medication administration      # Altered Mental Status / Bizarre behavior   r/o Toxic Metabolic Encephelopathy. AMS can be likely in the setting of his psychiatric disorder vs HTN emergency vs unspecified substance ingestion    - control BP management as below  - does not meet sepsis criteria   - UTox negative   - Psychiatry consult in AM  - will try to obtain more collateral if mental status improves    #HTN emergency  BP reportedly in 200s (highest documented 181/125) with EMILI and AMS   - trops negative, EKG   - pt refusing to take PO medication  - treat with prn dose of IV anti-hypertensives (Enalapril, labetalol hydralazine)   - f/u EKG  - obtain formal echocardiogram     #EMILI  likely pre-renal in the setting of dehydration (Cr improved fluid resuscitation)   - f/u urine lytes  - trend BMP     #Hypokalemia  likely 2/2 to poor PO intake   - replete K to > 4    Case discussed with Dr. Zapata 34 y/o M with hx of unspecified psychiatric disorder presenting with bizarre behavior found to be in hypertensive emergency admitted to tele unit in the setting of uncooperativeness with medication administration      # Altered Mental Status / Bizarre behavior   r/o Toxic Metabolic Encephelopathy. AMS can be likely in the setting of his psychiatric disorder vs HTN emergency vs unspecified substance ingestion    - control BP management as below  - does not meet sepsis criteria   - UTox negative   - Psychiatry consult in AM  - will try to obtain more collateral if mental status improves    #HTN emergency  BP reportedly in 200s (highest documented 181/125) with EMILI and AMS. s/p Hydralazine 50mg PO x 1, and Hydralazine 10mg IV push x 1  - trops negative, EKG   - pt refusing to take PO medication  - treat with prn dose of IV anti-hypertensives (Enalapril, labetalol hydralazine)   - f/u EKG  - obtain formal echocardiogram     #EMILI  likely pre-renal in the setting of dehydration (Cr improved fluid resuscitation) s/p 3L of NS   - f/u urine lytes  - trend BMP     #Hypokalemia  likely 2/2 to poor PO intake   - replete K to > 4    Case discussed with Dr. Zapata 32 y/o M with hx of unspecified psychiatric disorder presenting with bizarre behavior found to be in hypertensive emergency admitted to tele unit in the setting of uncooperativeness with medication administration      # Altered Mental Status / Bizarre behavior   r/o Toxic Metabolic Encephelopathy. AMS can be likely in the setting of his psychiatric disorder vs HTN emergency vs unspecified substance ingestion    - control BP management as below  - does not meet sepsis criteria   - UTox negative   - Psychiatry consult in AM  - will try to obtain more collateral if mental status improves    #HTN emergency  BP reportedly in 200s (highest documented 181/125) with EMILI and AMS. s/p Hydralazine 50mg PO x 1, and Hydralazine 10mg IV push x 2  - trops negative, EKG   - pt refusing to take PO medication  - treat with prn dose of IV anti-hypertensives (Enalapril, labetalol hydralazine)   - f/u EKG  - obtain formal echocardiogram     #EMILI  likely pre-renal in the setting of dehydration (Cr improved fluid resuscitation) s/p 3L of NS   - f/u urine lytes  - trend BMP     #Hypokalemia  likely 2/2 to poor PO intake   - replete K to > 4    Case discussed with Dr. Zapata

## 2019-07-21 NOTE — ED ADULT NURSE REASSESSMENT NOTE - NS ED NURSE REASSESS COMMENT FT1
received care from previous Rn, pt has bed on floor, transport here here for pt, Pt report given to Rn Ning on 07La with thanks, woke pt up, obeying commands, asked to squeeze my hands, pt complied, pupils [id number], pt non verbal at present,

## 2019-07-21 NOTE — PROGRESS NOTE ADULT - PROBLEM SELECTOR PLAN 2
Patient was unresponsive to questions during morning rounds. 1mg Ativan IV given for possible catatonia. Good response, now talking.   - Utox neg. CTH neg. Patient unresponsive on admission.   - Patient with history of schizoaffective disorder   - appreciate psych recs. CIWA protocol. No neuroleptics at this time. Follow BMPs & creatinine kinase.

## 2019-07-21 NOTE — BEHAVIORAL HEALTH ASSESSMENT NOTE - HPI (INCLUDE ILLNESS QUALITY, SEVERITY, DURATION, TIMING, CONTEXT, MODIFYING FACTORS, ASSOCIATED SIGNS AND SYMPTOMS)
The patient is a 33-year old man with a self reported history of schizoaffective disorder, who presented to [facility] from [facility] with altered mental status and labile hypertension. Initially uncooperative, patient's vital signs were treated and he is now admitted for further medical treatment of unstable vital signs and EMILI. Psychiatry consulted to rule out catatonia, after patient was minimally verbal and lethargic this morning. Ativan 1mg IV was given prior to psychiatric assessment.     On assessment, the patient is quiet, but calm and cooperative. He is able to engage in meaningful conversation. He says that he does not remember how he got to the hospital, which is strange because normally when he goes to the hospital he goes to the ED waiting room, gets registered, waits, and then gets admitted, and he doesn't remember any of this. He reports being in and out of hospitals, as he gets picked up off the streets if he is drinking too much and presents to psychiatric emergency rooms. He is surprised to find out that he is on a medical floor. The patient reports hearing voices and feeling depressed, but denies having suicidal thoughts. He reports that usually, when the voices get too overwhelming, he goes to the ED. He denies hearing voices currently, but he feels confused and a bit frightened. The patient says he has been drinking heavily lately, beer, liquor, anything he can find. Denies other substance use. Reports being on multiple psychiatric medications in the past, none currently.     The patient worries about his recent course of being in and out of hospitals and EDs. He says he was in [place] with a cousin recently, but impulsively came back to [facility] after his mother mentioned coming back. While he does have family, he reports weak support system.

## 2019-07-21 NOTE — PROGRESS NOTE ADULT - PROBLEM SELECTOR PLAN 4
On presentation BUN/Cr 46/2.38, slowly trending down s/p 3L NS and maintenance D5/0.5NS.   - Creatinine now 1.05. Responded well. Tolerating PO. F/U AM BMP.

## 2019-07-21 NOTE — BEHAVIORAL HEALTH ASSESSMENT NOTE - NSBHCHARTREVIEWVS_PSY_A_CORE FT
Vital Signs Last 24 Hrs  T(C): 36.8 (21 Jul 2019 10:17), Max: 37.5 (20 Jul 2019 19:42)  T(F): 98.3 (21 Jul 2019 10:17), Max: 99.5 (20 Jul 2019 19:42)  HR: 88 (21 Jul 2019 11:06) (72 - 119)  BP: 144/82 (21 Jul 2019 11:06) (127/75 - 193/116)  BP(mean): 104 (21 Jul 2019 11:06) (96 - 140)  RR: 16 (21 Jul 2019 11:06) (16 - 18)  SpO2: 98% (21 Jul 2019 11:06) (95% - 100%)

## 2019-07-21 NOTE — BEHAVIORAL HEALTH ASSESSMENT NOTE - NSBHCONSULTFOLLOWAFTERCARE_PSY_A_CORE FT
Patient does not have any psychiatric contraindications to discharge. Should he be medically cleared, patient should be given number [phone]-[facility], a free hotline that gives resources to those in need of mental health.     For outpatient psychiatric follow up, patient can go to [facility] mental [facility] clinic on [address], any day at 8:30am to establish psychiatric care. Patient can also follow up at [facility], which provides dual diagnosis mental health care.

## 2019-07-21 NOTE — BEHAVIORAL HEALTH ASSESSMENT NOTE - RISK ASSESSMENT
Patient at chronic elevated risk of harm to self due to substance use, chronic noncompliance. At low imminent risk.

## 2019-07-22 DIAGNOSIS — F25.9 SCHIZOAFFECTIVE DISORDER, UNSPECIFIED: ICD-10-CM

## 2019-07-22 LAB
ALBUMIN SERPL ELPH-MCNC: 3.3 G/DL — SIGNIFICANT CHANGE UP (ref 3.3–5)
ALP SERPL-CCNC: 64 U/L — SIGNIFICANT CHANGE UP (ref 40–120)
ALT FLD-CCNC: 25 U/L — SIGNIFICANT CHANGE UP (ref 10–45)
ANION GAP SERPL CALC-SCNC: 13 MMOL/L — SIGNIFICANT CHANGE UP (ref 5–17)
AST SERPL-CCNC: 20 U/L — SIGNIFICANT CHANGE UP (ref 10–40)
BILIRUB SERPL-MCNC: 0.4 MG/DL — SIGNIFICANT CHANGE UP (ref 0.2–1.2)
BUN SERPL-MCNC: 16 MG/DL — SIGNIFICANT CHANGE UP (ref 7–23)
CALCIUM SERPL-MCNC: 8.7 MG/DL — SIGNIFICANT CHANGE UP (ref 8.4–10.5)
CHLORIDE SERPL-SCNC: 108 MMOL/L — SIGNIFICANT CHANGE UP (ref 96–108)
CK SERPL-CCNC: 237 U/L — HIGH (ref 30–200)
CO2 SERPL-SCNC: 24 MMOL/L — SIGNIFICANT CHANGE UP (ref 22–31)
CREAT SERPL-MCNC: 1.03 MG/DL — SIGNIFICANT CHANGE UP (ref 0.5–1.3)
GLUCOSE BLDC GLUCOMTR-MCNC: 100 MG/DL — HIGH (ref 70–99)
GLUCOSE BLDC GLUCOMTR-MCNC: 69 MG/DL — LOW (ref 70–99)
GLUCOSE BLDC GLUCOMTR-MCNC: 76 MG/DL — SIGNIFICANT CHANGE UP (ref 70–99)
GLUCOSE BLDC GLUCOMTR-MCNC: 79 MG/DL — SIGNIFICANT CHANGE UP (ref 70–99)
GLUCOSE BLDC GLUCOMTR-MCNC: 80 MG/DL — SIGNIFICANT CHANGE UP (ref 70–99)
GLUCOSE BLDC GLUCOMTR-MCNC: 81 MG/DL — SIGNIFICANT CHANGE UP (ref 70–99)
GLUCOSE SERPL-MCNC: 87 MG/DL — SIGNIFICANT CHANGE UP (ref 70–99)
HBA1C BLD-MCNC: 4.8 % — SIGNIFICANT CHANGE UP (ref 4–5.6)
HCT VFR BLD CALC: 39.1 % — SIGNIFICANT CHANGE UP (ref 39–50)
HGB BLD-MCNC: 12.9 G/DL — LOW (ref 13–17)
MCHC RBC-ENTMCNC: 30.9 PG — SIGNIFICANT CHANGE UP (ref 27–34)
MCHC RBC-ENTMCNC: 33 GM/DL — SIGNIFICANT CHANGE UP (ref 32–36)
MCV RBC AUTO: 93.5 FL — SIGNIFICANT CHANGE UP (ref 80–100)
NRBC # BLD: 0 /100 WBCS — SIGNIFICANT CHANGE UP (ref 0–0)
PLATELET # BLD AUTO: 326 K/UL — SIGNIFICANT CHANGE UP (ref 150–400)
POTASSIUM SERPL-MCNC: 3.5 MMOL/L — SIGNIFICANT CHANGE UP (ref 3.5–5.3)
POTASSIUM SERPL-SCNC: 3.5 MMOL/L — SIGNIFICANT CHANGE UP (ref 3.5–5.3)
PROT SERPL-MCNC: 6.5 G/DL — SIGNIFICANT CHANGE UP (ref 6–8.3)
RBC # BLD: 4.18 M/UL — LOW (ref 4.2–5.8)
RBC # FLD: 12.9 % — SIGNIFICANT CHANGE UP (ref 10.3–14.5)
SODIUM SERPL-SCNC: 145 MMOL/L — SIGNIFICANT CHANGE UP (ref 135–145)
WBC # BLD: 5.8 K/UL — SIGNIFICANT CHANGE UP (ref 3.8–10.5)
WBC # FLD AUTO: 5.8 K/UL — SIGNIFICANT CHANGE UP (ref 3.8–10.5)

## 2019-07-22 PROCEDURE — 93306 TTE W/DOPPLER COMPLETE: CPT | Mod: 26

## 2019-07-22 PROCEDURE — 90792 PSYCH DIAG EVAL W/MED SRVCS: CPT

## 2019-07-22 PROCEDURE — 99233 SBSQ HOSP IP/OBS HIGH 50: CPT | Mod: GC

## 2019-07-22 RX ORDER — POTASSIUM CHLORIDE 20 MEQ
40 PACKET (EA) ORAL ONCE
Refills: 0 | Status: COMPLETED | OUTPATIENT
Start: 2019-07-22 | End: 2019-07-22

## 2019-07-22 RX ORDER — HYDRALAZINE HCL 50 MG
10 TABLET ORAL ONCE
Refills: 0 | Status: COMPLETED | OUTPATIENT
Start: 2019-07-22 | End: 2019-07-22

## 2019-07-22 RX ADMIN — Medication 2.5 MILLIGRAM(S): at 06:20

## 2019-07-22 RX ADMIN — Medication 10 MILLIGRAM(S): at 23:00

## 2019-07-22 RX ADMIN — Medication 40 MILLIEQUIVALENT(S): at 10:58

## 2019-07-22 RX ADMIN — Medication 2.5 MILLIGRAM(S): at 20:36

## 2019-07-22 RX ADMIN — Medication 20 MILLIGRAM(S): at 06:20

## 2019-07-22 RX ADMIN — Medication 2.5 MILLIGRAM(S): at 00:28

## 2019-07-22 RX ADMIN — Medication 2 MILLIGRAM(S): at 00:37

## 2019-07-22 RX ADMIN — HEPARIN SODIUM 5000 UNIT(S): 5000 INJECTION INTRAVENOUS; SUBCUTANEOUS at 06:26

## 2019-07-22 NOTE — PROGRESS NOTE ADULT - SUBJECTIVE AND OBJECTIVE BOX
Interval / Overnight:    VITAL SIGNS:  Vital Signs Last 24 Hrs  T(C): 36.6 (2019 09:21), Max: 37.2 (2019 18:17)  T(F): 97.9 (2019 09:21), Max: 98.9 (2019 18:17)  HR: 82 (2019 08:17) (64 - 88)  BP: 137/91 (2019 08:17) (126/69 - 153/82)  BP(mean): 110 (2019 08:17) (89 - 110)  RR: 16 (2019 08:17) (16 - 16)  SpO2: 98% (2019 08:17) (97% - 99%)    PHYSICAL EXAM:    General: in NAD, lying comfortably in bed  HEENT: normocephalic, atraumatic; PERRL, anicteric sclera; MMM  Neck: supple, no JVD, no thyromegaly, no lymphadenopathy  Cardiovascular: +S1/S2, RRR, no M/G/R  Respiratory: clear to auscultation B/L; no wheezing, no rales, no rhonchi  Gastrointestinal: soft, NT/ND; +BSx4, no organomegaly  Extremities: WWP; no edema, clubbing or cyanosis  Vascular: 2+ radial, DP/PT pulses B/L  Neurological: AAOx3; no focal deficits    MEDICATIONS:  MEDICATIONS  (STANDING):  enalapril 20 milliGRAM(s) Oral daily  heparin  Injectable 5000 Unit(s) SubCutaneous every 8 hours    MEDICATIONS  (PRN):  LORazepam   Injectable 2 milliGRAM(s) IV Push every 2 hours PRN CIWA-Ar score increase by 2 points and a total score of 7 or less      ALLERGIES:  Allergies    No Known Allergies    Intolerances        LABS:                        12.9   5.80  )-----------( 326      ( 2019 08:08 )             39.1     07-22    145  |  108  |  16  ----------------------------<  87  3.5   |  24  |  1.03    Ca    8.7      2019 08:08  Phos  2.8     07-21  Mg     2.2     07-21    TPro  6.5  /  Alb  3.3  /  TBili  0.4  /  DBili  x   /  AST  20  /  ALT  25  /  AlkPhos  64  07-22    PT/INR - ( 2019 18:45 )   PT: 13.6 sec;   INR: 1.22          PTT - ( 2019 18:45 )  PTT:34.4 sec  Urinalysis Basic - ( 2019 23:18 )    Color: Yellow / Appearance: Clear / S.025 / pH: x  Gluc: x / Ketone: NEGATIVE  / Bili: Small / Urobili: 4.0 E.U./dL   Blood: x / Protein: 30 mg/dL / Nitrite: NEGATIVE   Leuk Esterase: NEGATIVE / RBC: < 5 /HPF / WBC < 5 /HPF   Sq Epi: x / Non Sq Epi: x / Bacteria: Many /HPF      CAPILLARY BLOOD GLUCOSE      POCT Blood Glucose.: 80 mg/dL (2019 07:28)      RADIOLOGY & ADDITIONAL TESTS: Reviewed. Interval / Overnight:  No acute events overnight as per night team. When I spoke with Mr. Francis he brought up that he was concerned about some lesions he had developed on his thighs and lips. ROS negative.     VITAL SIGNS:  Vital Signs Last 24 Hrs  T(C): 36.6 (2019 09:21), Max: 37.2 (2019 18:17)  T(F): 97.9 (2019 09:21), Max: 98.9 (2019 18:17)  HR: 82 (2019 08:17) (64 - 88)  BP: 137/91 (2019 08:17) (126/69 - 153/82)  BP(mean): 110 (2019 08:17) (89 - 110)  RR: 16 (2019 08:17) (16 - 16)  SpO2: 98% (2019 08:17) (97% - 99%)    PHYSICAL EXAM:    General: in NAD, lying comfortably in bed  HEENT: normocephalic, atraumatic; PERRL, anicteric sclera; MMM, right lower lip lesion with fresh blood  Neck: supple, no thyromegaly, no lymphadenopathy  Cardiovascular: +S1/S2, RRR, no M/G/R  Respiratory: clear to auscultation B/L; no wheezing, no rales, no rhonchi  Extremities: WWP; no edema, clubbing or cyanosis. + inner right scrapes on the left side, questionable scabbed lesion on right inner thigh.   Vascular: 2+ radial, DP/PT pulses B/L    MEDICATIONS:  MEDICATIONS  (STANDING):  enalapril 20 milliGRAM(s) Oral daily  heparin  Injectable 5000 Unit(s) SubCutaneous every 8 hours    MEDICATIONS  (PRN):  LORazepam   Injectable 2 milliGRAM(s) IV Push every 2 hours PRN CIWA-Ar score increase by 2 points and a total score of 7 or less      ALLERGIES:  Allergies    No Known Allergies    Intolerances        LABS:                        12.9   5.80  )-----------( 326      ( 2019 08:08 )             39.1     07-22    145  |  108  |  16  ----------------------------<  87  3.5   |  24  |  1.03    Ca    8.7      2019 08:08  Phos  2.8     07-21  Mg     2.2     07-21    TPro  6.5  /  Alb  3.3  /  TBili  0.4  /  DBili  x   /  AST  20  /  ALT  25  /  AlkPhos  64  07-22    PT/INR - ( 2019 18:45 )   PT: 13.6 sec;   INR: 1.22          PTT - ( 2019 18:45 )  PTT:34.4 sec  Urinalysis Basic - ( 2019 23:18 )    Color: Yellow / Appearance: Clear / S.025 / pH: x  Gluc: x / Ketone: NEGATIVE  / Bili: Small / Urobili: 4.0 E.U./dL   Blood: x / Protein: 30 mg/dL / Nitrite: NEGATIVE   Leuk Esterase: NEGATIVE / RBC: < 5 /HPF / WBC < 5 /HPF   Sq Epi: x / Non Sq Epi: x / Bacteria: Many /HPF      CAPILLARY BLOOD GLUCOSE      POCT Blood Glucose.: 80 mg/dL (2019 07:28)      RADIOLOGY & ADDITIONAL TESTS: Reviewed. Hospital course:  32yo man with unknown PMHx presented to ED with hypertensive emergency and AMS. In ED patient was given 50mg hydralazine, 10mg labetalol. On 7LA patient was given 20mg Enalpril standing order with good response in BP. Also, catatonia was entertained as possible reason for AMS. 1mg Ativan IV push was given and patient became responsive almost immediately afterwards. Psychiatry consulted and revealed patient with history of schizoaffective disorder and recs CIWA protocol, hold off neuroleptics in light of possible catatonia, and safe for discharge. The patient deemed safe for transfer to regional medical floor.    Interval / Overnight:  No acute events overnight as per night team. When I spoke with Mr. Francis he brought up that he was concerned about some lesions he had developed on his thighs and lips. ROS negative.     VITAL SIGNS:  Vital Signs Last 24 Hrs  T(C): 36.6 (2019 09:21), Max: 37.2 (2019 18:17)  T(F): 97.9 (2019 09:21), Max: 98.9 (2019 18:17)  HR: 82 (2019 08:17) (64 - 88)  BP: 137/91 (2019 08:17) (126/69 - 153/82)  BP(mean): 110 (2019 08:17) (89 - 110)  RR: 16 (2019 08:17) (16 - 16)  SpO2: 98% (2019 08:17) (97% - 99%)    PHYSICAL EXAM:    General: in NAD, lying comfortably in bed  HEENT: normocephalic, atraumatic; PERRL, anicteric sclera; MMM, right lower lip lesion with fresh blood  Neck: supple, no thyromegaly, no lymphadenopathy  Cardiovascular: +S1/S2, RRR, no M/G/R  Respiratory: clear to auscultation B/L; no wheezing, no rales, no rhonchi  Extremities: WWP; no edema, clubbing or cyanosis. + inner right scrapes on the left side, questionable scabbed lesion on right inner thigh.   Vascular: 2+ radial, DP/PT pulses B/L    MEDICATIONS:  MEDICATIONS  (STANDING):  enalapril 20 milliGRAM(s) Oral daily  heparin  Injectable 5000 Unit(s) SubCutaneous every 8 hours    MEDICATIONS  (PRN):  LORazepam   Injectable 2 milliGRAM(s) IV Push every 2 hours PRN CIWA-Ar score increase by 2 points and a total score of 7 or less      ALLERGIES:  Allergies    No Known Allergies    Intolerances        LABS:                        12.9   5.80  )-----------( 326      ( 2019 08:08 )             39.1     07-22    145  |  108  |  16  ----------------------------<  87  3.5   |  24  |  1.03    Ca    8.7      2019 08:08  Phos  2.8     07-21  Mg     2.2     07-21    TPro  6.5  /  Alb  3.3  /  TBili  0.4  /  DBili  x   /  AST  20  /  ALT  25  /  AlkPhos  64  07-22    PT/INR - ( 2019 18:45 )   PT: 13.6 sec;   INR: 1.22          PTT - ( 2019 18:45 )  PTT:34.4 sec  Urinalysis Basic - ( 2019 23:18 )    Color: Yellow / Appearance: Clear / S.025 / pH: x  Gluc: x / Ketone: NEGATIVE  / Bili: Small / Urobili: 4.0 E.U./dL   Blood: x / Protein: 30 mg/dL / Nitrite: NEGATIVE   Leuk Esterase: NEGATIVE / RBC: < 5 /HPF / WBC < 5 /HPF   Sq Epi: x / Non Sq Epi: x / Bacteria: Many /HPF      CAPILLARY BLOOD GLUCOSE      POCT Blood Glucose.: 80 mg/dL (2019 07:28)      RADIOLOGY & ADDITIONAL TESTS: Reviewed.

## 2019-07-22 NOTE — PROGRESS NOTE ADULT - PROBLEM SELECTOR PLAN 1
Patient was hypertensive in ED on admission  - Enalapril 20mg QD.   - Pressures trending down - last read @ 137/91  - EMILI resolved

## 2019-07-22 NOTE — PROGRESS NOTE ADULT - ATTENDING COMMENTS
Patient seen and examined with house-staff during bedside rounds.  Resident note read, including vitals, physical findings, laboratory data, and radiological reports.   Revisions included below.  Direct personal management at bed side and extensive interpretation of the data.  Plan was outlined and discussed in details with the housestaff.  Decision making of high complexity  Action taken for acute disease activity to reflect the level of care provided:  - medication reconciliation  - review laboratory data  The patient was admitted for change in mental status. The patient is clinically stable. No clinical evidence of sepsis. Blood pressure is controlled. Patient still feels that he is disoriented. Neurological exam was unremarkable. The patient was evaluated for the floor.

## 2019-07-22 NOTE — SBIRT NOTE ADULT - NSSBIRTSAVECONSULT_GEN_A_CORE
Call placed to pt regarding lab results, went over labs with pt and encouraged pt to  See the DM educator and see if the diet can be revised to help lower the A1C  Pt verbalized understanding of this and pt will get the vitamin D3 2000 IU to take every day  Pt agreed to plan of care   Complete

## 2019-07-23 DIAGNOSIS — H02.409 UNSPECIFIED PTOSIS OF UNSPECIFIED EYELID: ICD-10-CM

## 2019-07-23 LAB
ANION GAP SERPL CALC-SCNC: 13 MMOL/L — SIGNIFICANT CHANGE UP (ref 5–17)
BUN SERPL-MCNC: 16 MG/DL — SIGNIFICANT CHANGE UP (ref 7–23)
CALCIUM SERPL-MCNC: 9.2 MG/DL — SIGNIFICANT CHANGE UP (ref 8.4–10.5)
CHLORIDE SERPL-SCNC: 105 MMOL/L — SIGNIFICANT CHANGE UP (ref 96–108)
CO2 SERPL-SCNC: 26 MMOL/L — SIGNIFICANT CHANGE UP (ref 22–31)
CREAT SERPL-MCNC: 1.05 MG/DL — SIGNIFICANT CHANGE UP (ref 0.5–1.3)
GLUCOSE BLDC GLUCOMTR-MCNC: 70 MG/DL — SIGNIFICANT CHANGE UP (ref 70–99)
GLUCOSE BLDC GLUCOMTR-MCNC: 71 MG/DL — SIGNIFICANT CHANGE UP (ref 70–99)
GLUCOSE BLDC GLUCOMTR-MCNC: 72 MG/DL — SIGNIFICANT CHANGE UP (ref 70–99)
GLUCOSE BLDC GLUCOMTR-MCNC: 79 MG/DL — SIGNIFICANT CHANGE UP (ref 70–99)
GLUCOSE SERPL-MCNC: 82 MG/DL — SIGNIFICANT CHANGE UP (ref 70–99)
HCT VFR BLD CALC: 38.8 % — LOW (ref 39–50)
HGB BLD-MCNC: 12.5 G/DL — LOW (ref 13–17)
MCHC RBC-ENTMCNC: 29.9 PG — SIGNIFICANT CHANGE UP (ref 27–34)
MCHC RBC-ENTMCNC: 32.2 GM/DL — SIGNIFICANT CHANGE UP (ref 32–36)
MCV RBC AUTO: 92.8 FL — SIGNIFICANT CHANGE UP (ref 80–100)
NRBC # BLD: 0 /100 WBCS — SIGNIFICANT CHANGE UP (ref 0–0)
PLATELET # BLD AUTO: 337 K/UL — SIGNIFICANT CHANGE UP (ref 150–400)
POTASSIUM SERPL-MCNC: 3.4 MMOL/L — LOW (ref 3.5–5.3)
POTASSIUM SERPL-SCNC: 3.4 MMOL/L — LOW (ref 3.5–5.3)
RBC # BLD: 4.18 M/UL — LOW (ref 4.2–5.8)
RBC # FLD: 12.5 % — SIGNIFICANT CHANGE UP (ref 10.3–14.5)
SODIUM SERPL-SCNC: 144 MMOL/L — SIGNIFICANT CHANGE UP (ref 135–145)
TSH SERPL-MCNC: 1.48 UIU/ML — SIGNIFICANT CHANGE UP (ref 0.35–4.94)
WBC # BLD: 5.21 K/UL — SIGNIFICANT CHANGE UP (ref 3.8–10.5)
WBC # FLD AUTO: 5.21 K/UL — SIGNIFICANT CHANGE UP (ref 3.8–10.5)

## 2019-07-23 PROCEDURE — 99283 EMERGENCY DEPT VISIT LOW MDM: CPT

## 2019-07-23 PROCEDURE — 99233 SBSQ HOSP IP/OBS HIGH 50: CPT | Mod: GC

## 2019-07-23 PROCEDURE — 99222 1ST HOSP IP/OBS MODERATE 55: CPT

## 2019-07-23 RX ORDER — POTASSIUM CHLORIDE 20 MEQ
20 PACKET (EA) ORAL ONCE
Refills: 0 | Status: DISCONTINUED | OUTPATIENT
Start: 2019-07-23 | End: 2019-07-23

## 2019-07-23 RX ORDER — OLANZAPINE 15 MG/1
2.5 TABLET, FILM COATED ORAL AT BEDTIME
Refills: 0 | Status: DISCONTINUED | OUTPATIENT
Start: 2019-07-23 | End: 2019-07-24

## 2019-07-23 RX ORDER — POTASSIUM CHLORIDE 20 MEQ
20 PACKET (EA) ORAL ONCE
Refills: 0 | Status: COMPLETED | OUTPATIENT
Start: 2019-07-23 | End: 2019-07-23

## 2019-07-23 RX ORDER — ENOXAPARIN SODIUM 100 MG/ML
40 INJECTION SUBCUTANEOUS DAILY
Refills: 0 | Status: DISCONTINUED | OUTPATIENT
Start: 2019-07-24 | End: 2019-07-25

## 2019-07-23 RX ORDER — POTASSIUM CHLORIDE 20 MEQ
40 PACKET (EA) ORAL ONCE
Refills: 0 | Status: COMPLETED | OUTPATIENT
Start: 2019-07-23 | End: 2019-07-23

## 2019-07-23 RX ORDER — OLANZAPINE 15 MG/1
2.5 TABLET, FILM COATED ORAL DAILY
Refills: 0 | Status: DISCONTINUED | OUTPATIENT
Start: 2019-07-23 | End: 2019-07-23

## 2019-07-23 RX ORDER — BUPROPION HYDROCHLORIDE 150 MG/1
75 TABLET, EXTENDED RELEASE ORAL DAILY
Refills: 0 | Status: DISCONTINUED | OUTPATIENT
Start: 2019-07-23 | End: 2019-07-23

## 2019-07-23 RX ORDER — BUPROPION HYDROCHLORIDE 150 MG/1
150 TABLET, EXTENDED RELEASE ORAL
Refills: 0 | Status: DISCONTINUED | OUTPATIENT
Start: 2019-07-23 | End: 2019-07-25

## 2019-07-23 RX ORDER — HYDRALAZINE HCL 50 MG
5 TABLET ORAL ONCE
Refills: 0 | Status: COMPLETED | OUTPATIENT
Start: 2019-07-23 | End: 2019-07-23

## 2019-07-23 RX ADMIN — Medication 20 MILLIEQUIVALENT(S): at 12:09

## 2019-07-23 RX ADMIN — OLANZAPINE 2.5 MILLIGRAM(S): 15 TABLET, FILM COATED ORAL at 21:49

## 2019-07-23 RX ADMIN — Medication 20 MILLIGRAM(S): at 12:09

## 2019-07-23 RX ADMIN — BUPROPION HYDROCHLORIDE 150 MILLIGRAM(S): 150 TABLET, EXTENDED RELEASE ORAL at 21:49

## 2019-07-23 RX ADMIN — Medication 10 MILLIGRAM(S): at 21:49

## 2019-07-23 RX ADMIN — Medication 5 MILLIGRAM(S): at 13:48

## 2019-07-23 RX ADMIN — Medication 2.5 MILLIGRAM(S): at 07:24

## 2019-07-23 RX ADMIN — HEPARIN SODIUM 5000 UNIT(S): 5000 INJECTION INTRAVENOUS; SUBCUTANEOUS at 13:51

## 2019-07-23 NOTE — PROGRESS NOTE ADULT - ASSESSMENT
34 y/o male with unknown psychiatric history brought in by EMS for AMS and hypertensive emrgency with labile hypertension (160-200/112-116). PO enalapril was controlling BP, however patient now refusing PO.

## 2019-07-23 NOTE — PROGRESS NOTE ADULT - PROBLEM SELECTOR PLAN 1
Patient was hypertensive in ED on admission 160-181/112-125  - Patient initially responded well to enalapril 20mg QD. However, refused to take bp meds this morning and given 2.5mg enalapril with SBP still in the 160s  - Patient currently without any symptoms (no headaches, blurry vision, dizziness, chest pain) despite most recent bp being 183/119. Spoke with patient and he is willing to take the po enalapril. Will recheck bp 20 minutes after pt takes medication  - Continue to monitor blood pressure  - EMILI resolved (creatinine 2.38 on admission, resolved currently at 1.05) Patient was hypertensive in ED on admission 160-181/112-125, has been intermittently hypertensive since admission. Given young age and persistent hypertension will look into secondary causes such as pheochromocytoma, hyperaldosteronism, renal etiology  - Patient initially responded well to enalapril 20mg QD. However, refused to take bp meds this morning and given 2.5mg enalapril with SBP still in the 160s  - Patient currently without any symptoms (no headaches, blurry vision, dizziness, chest pain) despite most recent bp being 183/119. Spoke with patient and he is willing to take the po enalapril. Will recheck bp 20 minutes after pt takes medication  - Continue to monitor blood pressure  - EMILI resolved (creatinine 2.38 on admission, resolved currently at 1.05)  - Will order plasma metanephrines and normetanephrines  - Will obtain plasma renin and aldosterone  - Will obtain TSH  - Will obtain renal ultrasound with doppler  - will increase bp meds to 20mg enalapril in the morning and 10mg in the evening

## 2019-07-23 NOTE — PROVIDER CONTACT NOTE (CRITICAL VALUE NOTIFICATION) - ASSESSMENT
BP: 183/119 T: 98.3 HR: 68 RR:15 02: 100%. Patient denies Ha/chest pain. hands bilateral and equal strength, pupils equaland reactive to light.

## 2019-07-23 NOTE — PROGRESS NOTE ADULT - PROBLEM SELECTOR PLAN 2
Patient was unresponsive to questions during morning rounds.   - Utox neg. CTH neg. Patient unresponsive on admission.   - Patient with history of schizoaffective disorder   - Will consider 1mg Ativan IV push as that has given a good response.

## 2019-07-23 NOTE — PROGRESS NOTE ADULT - SUBJECTIVE AND OBJECTIVE BOX
TRANSFER NOTE FROM [facility] to [facility] course:  32yo man with unknown PMHx presented to ED with hypertensive emergency and AMS. Patient was found to be acting bizarre, yelling nonsensically, by hotel staff. At [facility] ED, patient was hypertensive to 160-181/112-125. EKG with normal sinus rhythm. Patient was given 50mg hydralazine PO, then refused PO medications and so was given 10mg labetalol. On 7LA patient was given 20mg Enalpril standing order with good response in BP. Also, catatonia was entertained as possible reason for AMS. 1mg Ativan IV push was given and patient became responsive almost immediately afterwards. Psychiatry consulted and revealed patient with history of schizoaffective disorder and recs CIWA protocol, hold off neuroleptics in light of possible catatonia, and safe for discharge. The patient deemed safe for transfer to regional medical floor. TRANSFER NOTE FROM [facility] to [facility] course:  34yo man with unknown PMHx presented to ED with hypertensive emergency and AMS. Patient was found to be acting bizarre, yelling nonsensically, by hotel staff. At [facility] ED, patient was hypertensive to 160-181/112-125. EKG with normal sinus rhythm. Patient was given 50mg hydralazine PO, then refused PO medications and so was given 10mg labetalol. On 7LA patient was given 20mg Enalpril standing order with good response in BP. Also, catatonia was entertained as possible reason for AMS. 1mg Ativan IV push was given and patient became responsive almost immediately afterwards. Psychiatry consulted and revealed patient with history of schizoaffective disorder and recs CIWA protocol, starting Zyprexa 2.5mg qhs and Wellbutrin XL 75mg qdaily, and safe for discharge. The patient deemed safe for transfer to [facility] medical floor.       SUBJECTIVE / INTERVAL HPI: Patient seen and examined at bedside. Paged by nurse immediately once patient got on floor that patient was refusing po blood pressure medications. When spoke to patient, he was willing to take oral blood pressure medication. He reported feeling overwhelmed given the circumstances, but denied any headaches, dizziness, visual changes, or chest pain.     VITAL SIGNS:  Vital Signs Last 24 Hrs  T(C): 36.8 (23 Jul 2019 11:14), Max: 37.1 (22 Jul 2019 13:39)  T(F): 98.3 (23 Jul 2019 11:14), Max: 98.7 (22 Jul 2019 13:39)  HR: 68 (23 Jul 2019 11:14) (68 - 74)  BP: 183/119 (23 Jul 2019 11:14) (149/99 - 183/119)  BP(mean): 126 (23 Jul 2019 06:59) (117 - 129)  RR: 15 (23 Jul 2019 11:14) (15 - 17)  SpO2: 100% (23 Jul 2019 11:14) (97% - 100%)    PHYSICAL EXAM:    General: Young  male lying in bed with covers pulled over head, responds to questions although with 1-2 word answers  HEENT: NC/AT; PERRL, anicteric sclera; MMM  Neck: supple  Cardiovascular: +S1/S2; RRR  Respiratory: CTA B/L; no W/R/R  Gastrointestinal: soft, NT/ND; +BSx4  Extremities: WWP; no edema, clubbing or cyanosis  Vascular: 2+ radial, DP/PT pulses B/L  Neurological: AAOx3; no focal deficits    MEDICATIONS:  MEDICATIONS  (STANDING):  enalapril 20 milliGRAM(s) Oral daily  heparin  Injectable 5000 Unit(s) SubCutaneous every 8 hours  OLANZapine 2.5 milliGRAM(s) Oral at bedtime  potassium chloride    Tablet ER 20 milliEquivalent(s) Oral once    MEDICATIONS  (PRN):  LORazepam   Injectable 2 milliGRAM(s) IV Push every 2 hours PRN CIWA-Ar score increase by 2 points and a total score of 7 or less      ALLERGIES:  Allergies    No Known Allergies    Intolerances        LABS:                        12.5   5.21  )-----------( 337      ( 23 Jul 2019 07:02 )             38.8     07-23    144  |  105  |  16  ----------------------------<  82  3.4<L>   |  26  |  1.05    Ca    9.2      23 Jul 2019 07:02    TPro  6.5  /  Alb  3.3  /  TBili  0.4  /  DBili  x   /  AST  20  /  ALT  25  /  AlkPhos  64  07-22        CAPILLARY BLOOD GLUCOSE      POCT Blood Glucose.: 79 mg/dL (23 Jul 2019 10:07)      RADIOLOGY & ADDITIONAL TESTS: Reviewed. TRANSFER NOTE FROM [facility] to [facility] course:  32yo man with unknown PMHx presented to ED with hypertensive emergency and AMS. Patient was found to be acting bizarre, yelling nonsensically, by hotel staff. At [facility] ED, patient was hypertensive to 160-181/112-125. EKG with normal sinus rhythm. Patient was given 50mg hydralazine PO, then refused PO medications and so was given 10mg labetalol. On 7LA patient was given 20mg Enalpril standing order with good response in BP. Also, catatonia was entertained as possible reason for AMS. 1mg Ativan IV push was given and patient became responsive almost immediately afterwards. Psychiatry consulted and revealed patient with history of schizoaffective disorder and recs CIWA protocol, starting Zyprexa 2.5mg qhs and Wellbutrin XL 75mg qdaily, and safe for discharge. The patient deemed safe for transfer to [facility] medical floor.       SUBJECTIVE / INTERVAL HPI: Patient seen and examined at bedside. Paged by nurse immediately once patient got on floor that patient was refusing po blood pressure medications. When spoke to patient, he was willing to take oral blood pressure medication. He reported feeling overwhelmed given the circumstances, but denied any headaches, dizziness, visual changes, or chest pain.     VITAL SIGNS:  Vital Signs Last 24 Hrs  T(C): 36.8 (23 Jul 2019 11:14), Max: 37.1 (22 Jul 2019 13:39)  T(F): 98.3 (23 Jul 2019 11:14), Max: 98.7 (22 Jul 2019 13:39)  HR: 68 (23 Jul 2019 11:14) (68 - 74)  BP: 183/119 (23 Jul 2019 11:14) (149/99 - 183/119)  BP(mean): 126 (23 Jul 2019 06:59) (117 - 129)  RR: 15 (23 Jul 2019 11:14) (15 - 17)  SpO2: 100% (23 Jul 2019 11:14) (97% - 100%)    PHYSICAL EXAM:    General: Young  male lying in bed with covers pulled over head, responds to questions although with 1-2 word answers  HEENT: NC/AT; PERRL, anicteric sclera; MMM  Neck: supple  Cardiovascular: +S1/S2; RRR  Respiratory: CTA B/L; no W/R/R  Gastrointestinal: soft, NT/ND; +BSx4  Extremities: WWP; no edema, clubbing or cyanosis  Vascular: 2+ radial, DP/PT pulses B/L  Neurological: AAOx person, year, month, president ; left ptosis and facial droop, remainder of cn ii-xii intact, 5/5 str all 4 ext, no dysmetria/dysdiadochokinesia. no focal deficits    MEDICATIONS:  MEDICATIONS  (STANDING):  enalapril 20 milliGRAM(s) Oral daily  heparin  Injectable 5000 Unit(s) SubCutaneous every 8 hours  OLANZapine 2.5 milliGRAM(s) Oral at bedtime  potassium chloride    Tablet ER 20 milliEquivalent(s) Oral once    MEDICATIONS  (PRN):  LORazepam   Injectable 2 milliGRAM(s) IV Push every 2 hours PRN CIWA-Ar score increase by 2 points and a total score of 7 or less      ALLERGIES:  Allergies    No Known Allergies    Intolerances        LABS:                        12.5   5.21  )-----------( 337      ( 23 Jul 2019 07:02 )             38.8     07-23    144  |  105  |  16  ----------------------------<  82  3.4<L>   |  26  |  1.05    Ca    9.2      23 Jul 2019 07:02    TPro  6.5  /  Alb  3.3  /  TBili  0.4  /  DBili  x   /  AST  20  /  ALT  25  /  AlkPhos  64  07-22        CAPILLARY BLOOD GLUCOSE      POCT Blood Glucose.: 79 mg/dL (23 Jul 2019 10:07)      RADIOLOGY & ADDITIONAL TESTS: Reviewed. TRANSFER NOTE FROM [facility] to [facility] course:  34yo man with unknown PMHx presented to ED with hypertensive emergency and AMS. Patient was found to be acting bizarre, yelling nonsensically, by hotel staff. At [facility] ED, patient was hypertensive to 160-181/112-125. EKG with normal sinus rhythm. Patient was given 50mg hydralazine PO, then refused PO medications and so was given 10mg labetalol. On 7LA patient was given 20mg Enalpril standing order with good response in BP. Also, catatonia was entertained as possible reason for AMS. 1mg Ativan IV push was given and patient became responsive almost immediately afterwards. Psychiatry consulted and revealed patient with history of schizoaffective disorder and recs CIWA protocol, starting Zyprexa 2.5mg qhs and Wellbutrin XL 75mg qdaily, and safe for discharge. The patient deemed safe for transfer to [facility] medical floor.       SUBJECTIVE / INTERVAL HPI: Patient seen and examined at bedside. Paged by nurse immediately once patient got on floor that patient was refusing po blood pressure medications. When spoke to patient, he was willing to take oral blood pressure medication. He reported feeling overwhelmed given the circumstances, but denied any headaches, dizziness, visual changes, or chest pain.     VITAL SIGNS:  Vital Signs Last 24 Hrs  T(C): 36.8 (23 Jul 2019 11:14), Max: 37.1 (22 Jul 2019 13:39)  T(F): 98.3 (23 Jul 2019 11:14), Max: 98.7 (22 Jul 2019 13:39)  HR: 68 (23 Jul 2019 11:14) (68 - 74)  BP: 183/119 (23 Jul 2019 11:14) (149/99 - 183/119)  BP(mean): 126 (23 Jul 2019 06:59) (117 - 129)  RR: 15 (23 Jul 2019 11:14) (15 - 17)  SpO2: 100% (23 Jul 2019 11:14) (97% - 100%)    PHYSICAL EXAM:    General: Young  male lying in bed with covers pulled over head, responds to questions although with 1-2 word answers  HEENT: NC/AT; PERRL, anicteric sclera; MMM. Right sided ptosis and mild facial droop   Neck: supple  Cardiovascular: +S1/S2; RRR  Respiratory: CTA B/L; no W/R/R  Gastrointestinal: soft, NT/ND; +BSx4  Extremities: WWP; no edema, clubbing or cyanosis  Vascular: 2+ radial, DP/PT pulses B/L  Neurological: AAOx person, year, month, president ; left ptosis and facial droop, remainder of cn ii-xii intact, 5/5 str all 4 ext, no dysmetria/dysdiadochokinesia. no focal deficits    MEDICATIONS:  MEDICATIONS  (STANDING):  enalapril 20 milliGRAM(s) Oral daily  heparin  Injectable 5000 Unit(s) SubCutaneous every 8 hours  OLANZapine 2.5 milliGRAM(s) Oral at bedtime  potassium chloride    Tablet ER 20 milliEquivalent(s) Oral once    MEDICATIONS  (PRN):  LORazepam   Injectable 2 milliGRAM(s) IV Push every 2 hours PRN CIWA-Ar score increase by 2 points and a total score of 7 or less      ALLERGIES:  Allergies    No Known Allergies    Intolerances        LABS:                        12.5   5.21  )-----------( 337      ( 23 Jul 2019 07:02 )             38.8     07-23    144  |  105  |  16  ----------------------------<  82  3.4<L>   |  26  |  1.05    Ca    9.2      23 Jul 2019 07:02    TPro  6.5  /  Alb  3.3  /  TBili  0.4  /  DBili  x   /  AST  20  /  ALT  25  /  AlkPhos  64  07-22        CAPILLARY BLOOD GLUCOSE      POCT Blood Glucose.: 79 mg/dL (23 Jul 2019 10:07)      RADIOLOGY & ADDITIONAL TESTS: Reviewed.

## 2019-07-23 NOTE — PROGRESS NOTE ADULT - PROBLEM SELECTOR PLAN 7
1) PCP Contacted on Admission: (Y/N) --> Name & Phone #: Patient w/o PCP  2) Date of Contact with PCP:  3) PCP Contacted at Discharge: (Y/N)  4) Summary of Handoff Given to PCP:   5) Post-Discharge Appointment Date and Location: Fluids: none  Electrolytes: Replete as necessary   Nutrition: DASH/TLC    DVT: lovenox  Dispo: [facility]  Code: Full

## 2019-07-23 NOTE — PROGRESS NOTE ADULT - SUBJECTIVE AND OBJECTIVE BOX
Interval / Overnight:    VITAL SIGNS:  Vital Signs Last 24 Hrs  T(C): 37.1 (23 Jul 2019 13:18), Max: 37.1 (23 Jul 2019 13:18)  T(F): 98.7 (23 Jul 2019 13:18), Max: 98.7 (23 Jul 2019 13:18)  HR: 67 (23 Jul 2019 13:54) (67 - 74)  BP: 184/113 (23 Jul 2019 13:54) (149/99 - 184/113)  BP(mean): 126 (23 Jul 2019 06:59) (117 - 129)  RR: 16 (23 Jul 2019 13:18) (15 - 17)  SpO2: 99% (23 Jul 2019 13:18) (97% - 100%)    PHYSICAL EXAM:    General: in NAD, lying comfortably in bed  HEENT: normocephalic, atraumatic; PERRL, anicteric sclera; MMM  Neck: supple, no JVD, no thyromegaly, no lymphadenopathy  Cardiovascular: +S1/S2, RRR, no M/G/R  Respiratory: clear to auscultation B/L; no wheezing, no rales, no rhonchi  Gastrointestinal: soft, NT/ND; +BSx4, no organomegaly  Extremities: WWP; no edema, clubbing or cyanosis  Vascular: 2+ radial, DP/PT pulses B/L  Neurological: AAOx3; no focal deficits    MEDICATIONS:  MEDICATIONS  (STANDING):  enalapril 20 milliGRAM(s) Oral daily  OLANZapine 2.5 milliGRAM(s) Oral at bedtime    MEDICATIONS  (PRN):  LORazepam   Injectable 2 milliGRAM(s) IV Push every 2 hours PRN CIWA-Ar score increase by 2 points and a total score of 7 or less      ALLERGIES:  Allergies    No Known Allergies    Intolerances        LABS:                        12.5   5.21  )-----------( 337      ( 23 Jul 2019 07:02 )             38.8     07-23    144  |  105  |  16  ----------------------------<  82  3.4<L>   |  26  |  1.05    Ca    9.2      23 Jul 2019 07:02    TPro  6.5  /  Alb  3.3  /  TBili  0.4  /  DBili  x   /  AST  20  /  ALT  25  /  AlkPhos  64  07-22        CAPILLARY BLOOD GLUCOSE      POCT Blood Glucose.: 70 mg/dL (23 Jul 2019 12:10)      RADIOLOGY & ADDITIONAL TESTS: Reviewed. Interval / Overnight:  Mr. Francis was refusing his medications for blood pressure overnight. IV vasotec used to control BP. When I saw Mr. Francis this morning, he was unresponsive to questions.     VITAL SIGNS:  Vital Signs Last 24 Hrs  T(C): 37.1 (23 Jul 2019 13:18), Max: 37.1 (23 Jul 2019 13:18)  T(F): 98.7 (23 Jul 2019 13:18), Max: 98.7 (23 Jul 2019 13:18)  HR: 67 (23 Jul 2019 13:54) (67 - 74)  BP: 184/113 (23 Jul 2019 13:54) (149/99 - 184/113)  BP(mean): 126 (23 Jul 2019 06:59) (117 - 129)  RR: 16 (23 Jul 2019 13:18) (15 - 17)  SpO2: 99% (23 Jul 2019 13:18) (97% - 100%)    PHYSICAL EXAM:    General: in NAD, sleeping heavily on his stomach. Mute.   HEENT: normocephalic, atraumatic; PERRL, anicteric sclera; MMM, right lower lip ulcer, now bleeding  Neck: supple, no JVD, no thyromegaly, no lymphadenopathy  Cardiovascular: +S1/S2, RRR, no M/G/R  Respiratory: Shallow inspiration this AM made posterior lung fields difficult to auscultate. Anterior lung fields CTABL  Extremities: WWP; no edema, clubbing or cyanosis  Vascular: 2+ radial, DP/PT pulses B/L    MEDICATIONS:  MEDICATIONS  (STANDING):  enalapril 20 milliGRAM(s) Oral daily  OLANZapine 2.5 milliGRAM(s) Oral at bedtime    MEDICATIONS  (PRN):  LORazepam   Injectable 2 milliGRAM(s) IV Push every 2 hours PRN CIWA-Ar score increase by 2 points and a total score of 7 or less      ALLERGIES:  Allergies    No Known Allergies    Intolerances        LABS:                        12.5   5.21  )-----------( 337      ( 23 Jul 2019 07:02 )             38.8     07-23    144  |  105  |  16  ----------------------------<  82  3.4<L>   |  26  |  1.05    Ca    9.2      23 Jul 2019 07:02    TPro  6.5  /  Alb  3.3  /  TBili  0.4  /  DBili  x   /  AST  20  /  ALT  25  /  AlkPhos  64  07-22        CAPILLARY BLOOD GLUCOSE      POCT Blood Glucose.: 70 mg/dL (23 Jul 2019 12:10)      RADIOLOGY & ADDITIONAL TESTS: Reviewed. Hospital course:  32yo man with unknown PMHx presented to ED with hypertensive emergency and AMS. In ED patient was given 50mg hydralazine, 10mg labetalol. On 7LA patient was given 20mg Enalpril standing order with good response in BP. Also, catatonia was entertained as possible reason for AMS. 1mg Ativan IV push was given and patient became responsive almost immediately afterwards. Psychiatry consulted and revealed patient with history of schizoaffective disorder and recs CIWA protocol, held off of neuroleptics in light of possible catatonia. Psych also recommended starting Zyprexa 2.5 QD, buproprion 75. Zyprexa started, buproprion cant be dispensed at that dose as per pharmacy. The patient deemed safe for transfer to regional medical floor.    Interval / Overnight:  Mr. Francis was refusing his medications for blood pressure overnight. IV vasotec used to control BP. When I saw Mr. Francis this morning, he was unresponsive to questions.     VITAL SIGNS:  Vital Signs Last 24 Hrs  T(C): 37.1 (23 Jul 2019 13:18), Max: 37.1 (23 Jul 2019 13:18)  T(F): 98.7 (23 Jul 2019 13:18), Max: 98.7 (23 Jul 2019 13:18)  HR: 67 (23 Jul 2019 13:54) (67 - 74)  BP: 184/113 (23 Jul 2019 13:54) (149/99 - 184/113)  BP(mean): 126 (23 Jul 2019 06:59) (117 - 129)  RR: 16 (23 Jul 2019 13:18) (15 - 17)  SpO2: 99% (23 Jul 2019 13:18) (97% - 100%)    PHYSICAL EXAM:    General: in NAD, sleeping heavily on his stomach. Mute.   HEENT: normocephalic, atraumatic; PERRL, anicteric sclera; MMM, right lower lip ulcer, now bleeding  Neck: supple, no JVD, no thyromegaly, no lymphadenopathy  Cardiovascular: +S1/S2, RRR, no M/G/R  Respiratory: Shallow inspiration this AM made posterior lung fields difficult to auscultate. Anterior lung fields CTABL  Extremities: WWP; no edema, clubbing or cyanosis  Vascular: 2+ radial, DP/PT pulses B/L    MEDICATIONS:  MEDICATIONS  (STANDING):  enalapril 20 milliGRAM(s) Oral daily  OLANZapine 2.5 milliGRAM(s) Oral at bedtime    MEDICATIONS  (PRN):  LORazepam   Injectable 2 milliGRAM(s) IV Push every 2 hours PRN CIWA-Ar score increase by 2 points and a total score of 7 or less      ALLERGIES:  Allergies    No Known Allergies    Intolerances        LABS:                        12.5   5.21  )-----------( 337      ( 23 Jul 2019 07:02 )             38.8     07-23    144  |  105  |  16  ----------------------------<  82  3.4<L>   |  26  |  1.05    Ca    9.2      23 Jul 2019 07:02    TPro  6.5  /  Alb  3.3  /  TBili  0.4  /  DBili  x   /  AST  20  /  ALT  25  /  AlkPhos  64  07-22        CAPILLARY BLOOD GLUCOSE      POCT Blood Glucose.: 70 mg/dL (23 Jul 2019 12:10)      RADIOLOGY & ADDITIONAL TESTS: Reviewed.

## 2019-07-23 NOTE — PROGRESS NOTE ADULT - PROBLEM SELECTOR PLAN 5
History of diabetes, as per EMS. Blood glucose range 84- 147. No ketones in urine.  -fingersticks have been 70s-80s over last 48 hours  -HgA1c 4.8 Resolved. Creatinine @ 1.03

## 2019-07-23 NOTE — PROGRESS NOTE ADULT - ASSESSMENT
34 y/o male with unknown psychiatric history brought in by EMS for AMS and hypertensive emrgency with labile hypertension (160-200/112-116), responded to po enalapril, also with underlying schizoaffective disorder being managed by psychiatry.

## 2019-07-23 NOTE — PROGRESS NOTE BEHAVIORAL HEALTH - OTHER
lying in bed minimally productive pt does not respond concrete, answers with one word sentences concrete difficult to assess at this time

## 2019-07-23 NOTE — PROGRESS NOTE ADULT - PROBLEM SELECTOR PLAN 3
AMS likely 2/2 to psychiatric state vs. neuro pathology.   - continue with Zyprexa and Wellbutrin as per psych recs  - control BP management as below  - UTox negative Patient intermittently unsresponsive to questions. 1mg Ativan and then 2mg Ativan IV given for possible catatonia. Good response, pt talking  - Utox neg. CTH neg. Patient unresponsive on admission.   - Patient with history of schizoaffective disorder as per psychiatry, who agrees with the diagnosis  - appreciate psych recs. CIWA protocol every 6 hours given unclear substance history, CIWAs have been 0 up to date. Will d/c CIWA checks  - As per psych recs, will start on Zyprexa 2.5mg qhs. Psych also recommended Wellbutrin 75mg qdaily although min dose is 150. Will ask psych about recommendations  - Patient does not need 1:1 and does not need inpatient hospitalization as per psych. Will need to reconsult if patient continues to refuse po medications  - Patient's CIWA scores have been 0 over the last 48 hours

## 2019-07-23 NOTE — PROGRESS NOTE ADULT - PROBLEM SELECTOR PLAN 1
Patient was hypertensive in ED on admission  - Enalapril 20mg QD.   - Patient is now refusing PO. IV vasotec used as substitute to control BP  - EMILI resolved

## 2019-07-23 NOTE — PROGRESS NOTE ADULT - PROBLEM SELECTOR PLAN 6
Fluids: D5   Electrolytes: Replete as necessary   Nutrition:  GI:  DVT: lovenox SC  Glucose:  Bowel:  Pain:  Dispo: [id number]  Code: Full

## 2019-07-23 NOTE — PROGRESS NOTE ADULT - PROBLEM SELECTOR PLAN 3
AMS likely 2/2 to psychiatric state vs. neuro pathology.   - control BP management as below  - does not meet sepsis criteria   - UTox negative   - Psychiatry on board. Appreciate recs; zyprexa 2.5 qhs, buproprion QD. No admission at this time. Will try and obtain collateral.

## 2019-07-23 NOTE — CHART NOTE - NSCHARTNOTEFT_GEN_A_CORE
Note from Medicine to Psych: Wellbutrin XL can't be give in 75mg dose as per pharmacy. Smallest dose is 150. Please advise.

## 2019-07-23 NOTE — PROVIDER CONTACT NOTE (CRITICAL VALUE NOTIFICATION) - BACKGROUND
[facility] has history of unidentified psuch disorder. Patient does not talk much, and appears withdrawn with blankets over head.

## 2019-07-23 NOTE — PROGRESS NOTE ADULT - PROBLEM SELECTOR PLAN 4
Resolved. Creatinine @ 1.03 AMS likely 2/2 to psychiatric state vs. neuro pathology.   - continue with Zyprexa and Wellbutrin as per psych recs  - control BP management as below  - UTox negative

## 2019-07-23 NOTE — PROGRESS NOTE BEHAVIORAL HEALTH - NSBHCHARTREVIEWLAB_PSY_A_CORE FT
14.5   11.60 )-----------( 290      ( 21 Jul 2019 11:06 )             46.4       07-21    140  |  105  |  20  ----------------------------<  108<H>  see note   |  21<L>  |  1.05    Ca    8.4      21 Jul 2019 11:06  Phos  2.8     07-21  Mg     2.2     07-21    TPro  9.2<H>  /  Alb  3.9  /  TBili  0.8  /  DBili  x   /  AST  44<H>  /  ALT  43<H>  /  AlkPhos  94  07-20
12.5   5.21  )-----------( 337      ( 23 Jul 2019 07:02 )             38.8   07-23    144  |  105  |  16  ----------------------------<  82  3.4<L>   |  26  |  1.05    Ca    9.2      23 Jul 2019 07:02    TPro  6.5  /  Alb  3.3  /  TBili  0.4  /  DBili  x   /  AST  20  /  ALT  25  /  AlkPhos  64  07-22

## 2019-07-23 NOTE — PROGRESS NOTE ADULT - PROBLEM SELECTOR PLAN 6
Fluids: none  Electrolytes: Replete as necessary   Nutrition: DASH/TLC    DVT: hep subq  Dispo: [facility]  Code: Full History of diabetes, as per EMS. Blood glucose range 84- 147. No ketones in urine.  -fingersticks have been 70s-80s over last 48 hours  -HgA1c 4.8  -No current need for fingersticks

## 2019-07-23 NOTE — PROGRESS NOTE BEHAVIORAL HEALTH - NSBHCONSULTSUBSTANCEALCOHOL_PSY_A_CORE FT
Ativan 2mg PO/IM/IV PRN signs of alcohol withdrawal, including autonomic instability, SBP >110, Pulse>100, tremors
Ativan 2mg PO/IM/IV PRN signs of alcohol withdrawal, including autonomic instability, SBP >110, Pulse>100, tremors

## 2019-07-23 NOTE — CONSULT NOTE ADULT - SUBJECTIVE AND OBJECTIVE BOX
Neurology Stroke Consult Note    HPI  33y Male w/ unknown PMH coming in with hypertensive urgency, AMS, also being followed by psych for schizoaffective disorder.   Neuro stroke  consult for right ptosis - possibly patient's baseline. Pt says "yes" when asked if he has had this before, and when asked when he states "all my life". Pt states his full name and age. Following all commands.      Later seen with Dr. Melvin, pt not speaking and unwilling to cooperate with exam   Denies headache. States he is okay.  Unwilling to answer further questions.      MEDICATIONS  (STANDING):  buPROPion  milliGRAM(s) Oral two times a day  enalapril 10 milliGRAM(s) Oral every 24 hours  enalapril 20 milliGRAM(s) Oral daily  OLANZapine 2.5 milliGRAM(s) Oral at bedtime    MEDICATIONS  (PRN):  LORazepam   Injectable 2 milliGRAM(s) IV Push every 2 hours PRN CIWA-Ar score increase by 2 points and a total score of 7 or less      Allergies    No Known Allergies    Intolerances        ROS: As per HPI, otherwise negative    Vital Signs Last 24 Hrs  T(C): 37.1 (23 Jul 2019 13:18), Max: 37.1 (23 Jul 2019 13:18)  T(F): 98.7 (23 Jul 2019 13:18), Max: 98.7 (23 Jul 2019 13:18)  HR: 77 (23 Jul 2019 16:56) (67 - 77)  BP: 178/117 (23 Jul 2019 16:56) (155/94 - 184/113)  BP(mean): 126 (23 Jul 2019 06:59) (118 - 129)  RR: 16 (23 Jul 2019 16:56) (15 - 17)  SpO2: 100% (23 Jul 2019 16:56) (97% - 100%)    Physical exam:  General: awake and alert, sitting comfortably, no acute distress    Neurologic:  Mental status: awake, alert, oriented to person, place, and age. Answers most questions appropriately without dysarthria, follows one step commands.   Cranial nerves:   II: visual fields are full to confrontation. pupils equally round and reactive to light,   III, IV, VI: EOMI without nystagmus. Slight right eye ptosis  VII: no facial droop  XII: tongue midline  Motor: Normal bulk and tone, strength 5/5 in b/l UE and LE,  strength 5/5. No pronator drift  Sensation: intact to light touch. No neglect.  Coordination: No dysmetria on finger-to-nose  Reflexes: downgoing toes bilaterally  Gait: deferred        LABS:                        12.5   5.21  )-----------( 337      ( 23 Jul 2019 07:02 )             38.8     07-23    144  |  105  |  16  ----------------------------<  82  3.4<L>   |  26  |  1.05    Ca    9.2      23 Jul 2019 07:02    TPro  6.5  /  Alb  3.3  /  TBili  0.4  /  DBili  x   /  AST  20  /  ALT  25  /  AlkPhos  64  07-22          RADIOLOGY & ADDITIONAL TESTS:  < from: CT Head No Cont (07.20.19 @ 23:50) >  IMPRESSION:   No intracranial hemorrhage or transcortical infarct.    < end of copied text >        Assessment and Plan  33y Male w/ unknown PMH coming in with hypertensive urgency, AMS, also being followed by psych for schizoaffective disorder.   Neuro stroke  consult for right ptosis - possibly patient's baseline    -No need for MRI at this time as pt has no focal deficits  -Continue care per medicine and psych  -Please reconsult stroke team as needed

## 2019-07-23 NOTE — PROGRESS NOTE ADULT - PROBLEM SELECTOR PLAN 2
Patient intermittently unsresponsive to questions. 1mg Ativan and then 2mg Ativan IV given for possible catatonia. Good response, pt talking  - Utox neg. CTH neg. Patient unresponsive on admission.   - Patient with history of schizoaffective disorder as per psychiatry, who agrees with the diagnosis  - appreciate psych recs. CIWA protocol every 6 hours given unclear substance history, CIWAs have been 0 up to date.   - As per psych recs, will start on Zyprexa 2.5mg qhs. Psych also recommended Wellbutrin 75mg qdaily although min dose is 150. Will ask psych about recommendations  - Patient does not need 1:1 and does not need inpatient hospitalization as per psych. Will need to reconsult if patient continues to refuse po medications  - Patient's CIWA scores have been 0 over the last 48 hours Patient with ptosis on right side with mild right facial droop. Patient does not know if how long he has had this facial droop for. Concern patient may have had a stroke in his past or have had another neurological process  -consulted neurology for utility of MRI. Will f/u recs

## 2019-07-23 NOTE — PROGRESS NOTE ADULT - ATTENDING COMMENTS
a/  metabolic encephalopathy  right facial droop/ptosis  EMILI  schizoaffective d/o  hypertensive emergency    p/  neuro consult  ? need for MRI brain  check HIV/hepatitis panel  check jim/renin/renal u/s w/ dopplers / plasma normetanephrines and metanephrines  LOGAN reagan a/  metabolic encephalopathy  right facial droop/ptosis  EMILI  schizoaffective d/o  hypertensive emergency    p/  neuro consult  ? need for MRI brain    check HIV/hepatitis panel  check jim/renin/renal u/s w/ dopplers / plasma normetanephrines and metanephrines  SW evaluation

## 2019-07-24 PROCEDURE — 99233 SBSQ HOSP IP/OBS HIGH 50: CPT | Mod: GC

## 2019-07-24 PROCEDURE — 99283 EMERGENCY DEPT VISIT LOW MDM: CPT

## 2019-07-24 RX ADMIN — BUPROPION HYDROCHLORIDE 150 MILLIGRAM(S): 150 TABLET, EXTENDED RELEASE ORAL at 11:10

## 2019-07-24 RX ADMIN — Medication 10 MILLIGRAM(S): at 21:35

## 2019-07-24 RX ADMIN — Medication 20 MILLIGRAM(S): at 11:09

## 2019-07-24 NOTE — PROGRESS NOTE BEHAVIORAL HEALTH - NSBHCONSULTFOLLOWAFTERCARE_PSY_A_CORE FT
TBD
Patient does not have any psychiatric contraindications to discharge. Should he be medically cleared, patient should be given number 1800-[facility], a free hotline that gives resources to those in need of mental health.     For outpatient psychiatric follow up, patient can go to [facility] Outpatient mental health clinic on [address], any day at 8:30am to establish psychiatric care. Patient can also follow up at [facility], which provides dual diagnosis mental health care.
Patient does not have any psychiatric contraindications to discharge. Should he be medically cleared, patient should be given number 1800-[facility], a free hotline that gives resources to those in need of mental health.     For outpatient psychiatric follow up, patient can go to [facility] Outpatient mental health clinic on [address], any day at 8:30am to establish psychiatric care. Patient can also follow up at [facility], which provides dual diagnosis mental health care.

## 2019-07-24 NOTE — PROGRESS NOTE ADULT - PROBLEM SELECTOR PLAN 4
AMS likely 2/2 to psychiatric state vs. neuro pathology.   - UTox negative  - Zyprexa held 7/24 PM due to decreased communication.  Plan:    - appreciate psych recs: c/w wellbutrin; reassess mental status in 7/25 AM before restarting zyprexa.    - control BP management as below

## 2019-07-24 NOTE — PROGRESS NOTE ADULT - PROBLEM SELECTOR PROBLEM 1
Hypertensive emergency

## 2019-07-24 NOTE — PROGRESS NOTE BEHAVIORAL HEALTH - NSBHFUPINTERVALHXFT_PSY_A_CORE
Pt appears more confused today, unable to answer questions or follow commands. Pt was able to engage in conversation on 2/22 and provide coherent history.
Pt is lying in bed. Pt with increased confusion today, less able to engage in the conversation. Answers some questions but with significant delay. Confrms that he has family, unable to provide contact info. Has iphone by bed side and would like to charge it so he can retrieve contact numbers.   Pt has hx/o hospitalizations at [facility] and [facility] in the past.
Pt is lying in bed. Able to engage in meaningful conversation. No evidence of catatonia. Pt reports living on the [address]. He states he has a diagnosis of SAD and was taking Zyprexa and Wellbutrin in the past. Currently endorses chronic depression. No active SI/plan or intent. States that he has been staying with aunt. Has a phone on him with contact numbers but needs a  to retrieve them. No evidence of acute psychosis at this time. Pt is agreeable to restart his medications.   Pt states he has been hospitalized at [facility] and [facility] in the past. Somewhat vague when asked about reasons for hospitalizations.

## 2019-07-24 NOTE — PROGRESS NOTE ADULT - PROBLEM SELECTOR PLAN 1
Patient was hypertensive in ED on admission 160-181/112-125, has been intermittently hypertensive since admission. Given young age and persistent hypertension will look into secondary causes such as pheochromocytoma, hyperaldosteronism, renal etiology. Patient denies any ssx of hypertensive emergency (headache, vision changes, dizziness, chest pain)  - EMILI resolved (creatinine 2.38 on admission, resolved currently at 1.05)  -TSH 1.481 (wnl)  Plan:    -Continue to monitor blood pressure    -f/u plasma metanephrines and normetanephrines    -f/u plasma renin and aldosterone    -c/w enalapril 20mg in AM and 10mg in PM

## 2019-07-24 NOTE — PROGRESS NOTE BEHAVIORAL HEALTH - OTHER
appears to not understand commands unable to assess lying in bed minimally verbal minimally productive pt does not respond appears confused concrete difficult to assess at this time

## 2019-07-24 NOTE — PROGRESS NOTE ADULT - PROBLEM SELECTOR PLAN 7
Fluids: none  Electrolytes: Replete as necessary   Nutrition: DASH/TLC    DVT: lovenox  Dispo: RMF  Code: Full

## 2019-07-24 NOTE — PROGRESS NOTE BEHAVIORAL HEALTH - NSBHCONSULTMEDNEW_PSY_A_CORE
yes
Same Histology In Subsequent Stages Text: The pattern and morphology of the tumor is as described in the first stage.

## 2019-07-24 NOTE — PROGRESS NOTE BEHAVIORAL HEALTH - NSBHCONSULTMEDS_PSY_A_CORE FT
Would recommend TSH, B12, Folate, Thiamine level if patient will remain in hospital

## 2019-07-24 NOTE — PROGRESS NOTE ADULT - SUBJECTIVE AND OBJECTIVE BOX
INCOMPLETE    OVERNIGHT EVENTS: As per nurse and patient, no o/n events    SUBJECTIVE / INTERVAL HPI: Patient seen and examined at bedside. Patient resting comfortably, no complaints at this time. Patient denies: dizziness, weakness, HA, changes in vision, CP, palpitations, SOB, cough, N/V/D/C, dysuria, LE edema. ROS otherwise negative. On exam, patient was    VITAL SIGNS:  Vital Signs Last 24 Hrs  T(C): 36.8 (24 Jul 2019 11:15), Max: 36.9 (23 Jul 2019 21:42)  T(F): 98.2 (24 Jul 2019 11:15), Max: 98.5 (23 Jul 2019 21:42)  HR: 67 (24 Jul 2019 11:15) (67 - 77)  BP: 159/97 (24 Jul 2019 11:15) (144/83 - 178/117)  BP(mean): --  RR: 17 (24 Jul 2019 11:15) (16 - 17)  SpO2: 98% (24 Jul 2019 11:15) (98% - 100%)      PHYSICAL EXAM:  General: comfortable in bed, NAD  Neurological: AAOx3, no focal deficits  HEENT: NC/AT; EOMI, PERRL, anicteric sclera, no discharge or exudate from eyes or nose; MMM  Neck: supple, no cervical or post-auricular lymphadenopathy  Cardiovascular: +S1/S2, no murmurs/rubs/gallops, RRR  Respiratory: CTA B/L, no diminished breath sounds, no wheezes/rales/rhonchi  Gastrointestinal: soft, NT/ND; active BSx4 quadrants  Extremities: WWP; no edema, clubbing or cyanosis  Vascular: 2+ radial, DP/PT pulses B/L  Skin: no rashes      MEDICATIONS:  MEDICATIONS  (STANDING):  buPROPion  milliGRAM(s) Oral two times a day  enalapril 10 milliGRAM(s) Oral every 24 hours  enalapril 20 milliGRAM(s) Oral daily  enoxaparin Injectable 40 milliGRAM(s) SubCutaneous daily  OLANZapine 2.5 milliGRAM(s) Oral at bedtime    MEDICATIONS  (PRN):  LORazepam   Injectable 2 milliGRAM(s) IV Push every 2 hours PRN CIWA-Ar score increase by 2 points and a total score of 7 or less        ALLERGIES/INTOLERANCES:  Allergies    No Known Allergies    Intolerances          LABS:                        12.5   5.21  )-----------( 337      ( 23 Jul 2019 07:02 )             38.8     07-23    144  |  105  |  16  ----------------------------<  82  3.4<L>   |  26  |  1.05    Ca    9.2      23 Jul 2019 07:02          CAPILLARY BLOOD GLUCOSE      POCT Blood Glucose.: 70 mg/dL (23 Jul 2019 12:10)          RADIOLOGY & ADDITIONAL TESTS: Reviewed. OVERNIGHT EVENTS: As per overnight staff and patient, no o/n events    SUBJECTIVE / INTERVAL HPI: Patient seen and examined at bedside. Patient resting comfortably. Patient was non-verbal and did not answer all questions during ROS. Patient communicated non-verbally (nodding/shaking head). He denied any weakness, headaches, changes in vision, chest pain, palpitations, and denied any pain.    VITAL SIGNS:  Vital Signs Last 24 Hrs  T(C): 36.8 (24 Jul 2019 11:15), Max: 36.9 (23 Jul 2019 21:42)  T(F): 98.2 (24 Jul 2019 11:15), Max: 98.5 (23 Jul 2019 21:42)  HR: 67 (24 Jul 2019 11:15) (67 - 77)  BP: 159/97 (24 Jul 2019 11:15) (144/83 - 178/117)  BP(mean): --  RR: 17 (24 Jul 2019 11:15) (16 - 17)  SpO2: 98% (24 Jul 2019 11:15) (98% - 100%)    PHYSICAL EXAM:  General: comfortable in bed, NAD  Neurological: unable to assess orientation as patient was non-verbal  HEENT: NC/AT; EOMI, PERRL, anicteric sclera, no discharge or exudate from eyes or nose; bleeding and scabbed wound on lower lip, MMM  Neck: supple  Cardiovascular: +S1/S2, no murmurs/rubs/gallops, RRR  Respiratory: CTA B/L, no diminished breath sounds, no wheezes/rales/rhonchi  Gastrointestinal: soft, NT/ND; active BSx4 quadrants  Extremities: WWP; no edema, clubbing or cyanosis  Vascular: 2+ radial, DP/PT pulses B/L    MEDICATIONS:  MEDICATIONS  (STANDING):  buPROPion  milliGRAM(s) Oral two times a day  enalapril 10 milliGRAM(s) Oral every 24 hours  enalapril 20 milliGRAM(s) Oral daily  enoxaparin Injectable 40 milliGRAM(s) SubCutaneous daily  OLANZapine 2.5 milliGRAM(s) Oral at bedtime    MEDICATIONS  (PRN):  LORazepam   Injectable 2 milliGRAM(s) IV Push every 2 hours PRN CIWA-Ar score increase by 2 points and a total score of 7 or less        ALLERGIES/INTOLERANCES:  Allergies    No Known Allergies    Intolerances          LABS:                        12.5   5.21  )-----------( 337      ( 23 Jul 2019 07:02 )             38.8     07-23    144  |  105  |  16  ----------------------------<  82  3.4<L>   |  26  |  1.05    Ca    9.2      23 Jul 2019 07:02          CAPILLARY BLOOD GLUCOSE      POCT Blood Glucose.: 70 mg/dL (23 Jul 2019 12:10)          RADIOLOGY & ADDITIONAL TESTS: Reviewed.

## 2019-07-24 NOTE — PROVIDER CONTACT NOTE (MEDICATION) - ACTION/TREATMENT ORDERED:
Monitored vs. Encouraged pt. several times to take medication. Made pt. aware that his blood pressure was elevated. Day RN also notified. To follow up.

## 2019-07-24 NOTE — PROGRESS NOTE BEHAVIORAL HEALTH - PROBLEM SELECTOR PLAN 1
Increase Zyprexa to 5 mg qhs  Wellbutrin XL 75 mg qday  Contact family to get collaterals  Obtain records from [facility]   No need for CO. Pt is not interested in psychiatric admission and does not meet criteria for involuntary hospitalization.
Zyprexa 2.5 mg qhs  Wellbutrin XL 75 mg qday  Contact family to get collaterals  Obtain records from [facility]   No need for CO. Pt is not interested in psychiatric admission and does not meet criteria for involuntary hospitalization.
Hold  Zyprexa tonight. Pt with worsening mental status.   Continue Wellbutrin XL 75 mg qday  Contact family to get collaterals  Obtain records from [facility]   No need for CO. Pt is not interested in psychiatric admission and does not meet criteria for involuntary hospitalization.

## 2019-07-24 NOTE — PROGRESS NOTE BEHAVIORAL HEALTH - NSBHADMITCOUNSEL_PSY_A_CORE
diagnostic results/impressions and/or recommended studies/risks and benefits of treatment options/instructions for management, treatment and follow up
diagnostic results/impressions and/or recommended studies/instructions for management, treatment and follow up/risks and benefits of treatment options
instructions for management, treatment and follow up/risks and benefits of treatment options/diagnostic results/impressions and/or recommended studies

## 2019-07-24 NOTE — PROGRESS NOTE BEHAVIORAL HEALTH - ABNORMAL MOVEMENTS
Reasonable stable since last visit  
No abnormal movements

## 2019-07-24 NOTE — PROGRESS NOTE ADULT - ASSESSMENT
34 y/o male with unknown psychiatric history brought in by EMS for AMS and hypertensive emrgency with labile hypertension (160-200/112-116), responded to po enalapril, also with underlying schizoaffective disorder being managed by psychiatry.     Psychiatry assessed patient today and endorsed that he is much less responsive that two days prior.

## 2019-07-24 NOTE — PROGRESS NOTE BEHAVIORAL HEALTH - NSBHCHARTREVIEWVS_PSY_A_CORE FT
Vital Signs Last 24 Hrs  T(C): 37.1 (22 Jul 2019 13:39), Max: 37.2 (21 Jul 2019 18:17)  T(F): 98.7 (22 Jul 2019 13:39), Max: 98.9 (21 Jul 2019 18:17)  HR: 82 (22 Jul 2019 08:17) (64 - 82)  BP: 137/91 (22 Jul 2019 08:17) (126/69 - 153/82)  BP(mean): 110 (22 Jul 2019 08:17) (89 - 110)  RR: 16 (22 Jul 2019 08:17) (16 - 16)  SpO2: 98% (22 Jul 2019 08:17) (97% - 99%)
Vital Signs Last 24 Hrs  T(C): 36.8 (24 Jul 2019 11:15), Max: 36.9 (23 Jul 2019 21:42)  T(F): 98.2 (24 Jul 2019 11:15), Max: 98.5 (23 Jul 2019 21:42)  HR: 67 (24 Jul 2019 11:15) (67 - 77)  BP: 159/97 (24 Jul 2019 11:15) (144/83 - 178/117)  BP(mean): --  RR: 17 (24 Jul 2019 11:15) (16 - 17)  SpO2: 98% (24 Jul 2019 11:15) (98% - 100%)
Vital Signs Last 24 Hrs  T(C): 37.1 (23 Jul 2019 13:18), Max: 37.1 (23 Jul 2019 13:18)  T(F): 98.7 (23 Jul 2019 13:18), Max: 98.7 (23 Jul 2019 13:18)  HR: 67 (23 Jul 2019 13:18) (67 - 74)  BP: 80/119 (23 Jul 2019 13:18) (80/119 - 183/119)  BP(mean): 126 (23 Jul 2019 06:59) (117 - 129)  RR: 16 (23 Jul 2019 13:18) (15 - 17)  SpO2: 99% (23 Jul 2019 13:18) (97% - 100%)

## 2019-07-24 NOTE — PROGRESS NOTE BEHAVIORAL HEALTH - SUMMARY
The patient is a 33-year old man with a self reported history of schizoaffective disorder, who presented to [facility] from [facility] with altered mental status and labile hypertension. Initially uncooperative, patient's vital signs were treated and he is now admitted for further medical treatment of unstable vital signs and EMILI. Psychiatry consulted to rule out catatonia, after patient was minimally verbal and lethargic this morning. Ativan 1mg IV was given prior to psychiatric assessment.   Currently pt appears more confused, minimally engaged in conversation, demonstrating delayed responses. There is no evidence of AVH/PI/HI/SI.     Plan:   1. Patient does NOT need 1:1  2. Patient does not require inpatient hospitalization  3. Delirium work up as per primary team as pt appears more confused today.   4. Continue Wellbutrin 75 mg qday and increase Zyprexa to 5 mg qhs   5. Keep blinds open during day, frequent reorientation techniques, clock visible in room  6. Monitor for alcohol withdrawal  7. Would enlist help of Social Work for shelter referral, community resources (pt has a phone with contact numbers)
The patient is a 33-year old man with a self reported history of schizoaffective disorder, who presented to [facility] from [facility] with altered mental status and labile hypertension. Initially uncooperative, patient's vital signs were treated and he is now admitted for further medical treatment of unstable vital signs and EMILI. Psychiatry consulted to rule out catatonia, after patient was minimally verbal and lethargic this morning. Ativan 1mg IV was given prior to psychiatric assessment.     On examination, the patient is alert, oriented, does not show any echopraxia/echolalia, no posturing, is not mute, is not hyperaroused, is mobile, shows no frontal release signs, is constricted in affect and a bit withdrawn, but shows good eye contact. He is somewhat tangential but not overly disorganized. There is low concern for catatonia as he scores only 2 on the Don-Brandon Catatonia rating scale. There is also low concern for neuroleptic malignant syndrome at this time, as his WBC and CPK are not severely elevated and he is afebrile. He has no rigidity on examination, and has also been noncompliant with neuroleptic medication.   The patient does have a psychiatric history, and a clear substance use disorder. While he does not meet criteria for inpatient psychiatric hospitalization at this time, he would benefit from outpatient referrals and treatment.  At this time, he is not acutely psychotic, not acutely suicidal, and fairly well oriented. He is, however, inattentive, mildly confused, and mildly lethargic, pointing most towards a hypoactive delirium.     Plan:   1. Patient does NOT need 1:1  2. Patient does not require inpatient hospitalization  3. Would continue to treat unstable vital signs, encourage adequate PO hydration  4. Hold neuroleptic medications at this time due to concern for catatonia  5. Keep blinds open during day, frequent reorientation techniques, clock visible in room  6. Monitor for alcohol withdrawal  7. Would enlist help of Social Work for shelter referral, community resources (pt has a phone with contact numbers)  8. Start Zyprexa 2.5 mg qhs and Wellbutrin XL 75 mg qday. Obtain records from [facility].     Patient does not have any psychiatric contraindications to discharge. Should he be medically cleared, patient should be given number [facility], a free hotline that gives resources to those in need of mental health.     For outpatient psychiatric follow up, patient can go to [facility] Outpatient mental health clinic on [address], any day at 8:30am to establish psychiatric care. Patient can also follow up at [facility], which provides dual diagnosis mental health care.
The patient is a 33-year old man with a self reported history of schizoaffective disorder, who presented to [facility] from [facility] with altered mental status and labile hypertension. Initially uncooperative, patient's vital signs were treated and he is now admitted for further medical treatment of unstable vital signs and EMILI. Psychiatry consulted to rule out catatonia, after patient was minimally verbal and lethargic this morning. Ativan 1mg IV was given prior to psychiatric assessment.   Currently pt presents with worsening in MS, minimally engaged in conversation, demonstrating delayed responses, unable to follow directions.

## 2019-07-24 NOTE — PROGRESS NOTE ADULT - PROBLEM SELECTOR PLAN 3
Patient intermittently unsresponsive to questions. 1mg Ativan and then 2mg Ativan IV given for possible catatonia. Good response, pt talking  - Utox neg. CTH neg. Patient unresponsive on admission.   - CIWA protocol initiated due to unclear substance history. d/c'ed as CIWAs were 0 and low suspicion for alcohol withdrawal.  - Patient with history of schizoaffective disorder as per psychiatry, who agrees with the diagnosis  - psychiatry following patient. Recommended: 1) Zyprexa 2.5mg qhs, 2) Wellbutrin 75mg qdaily  - Psychiatry reported patient appears much less communicative than two days ago, evening zyprexa held on 7/24.   Plan:    -reassess pt's mental status on 7/25 (zyprexa held 7/24) to assess if zyprexa can explain decreased communication seen on exam.    -Patient does not need 1:1 and does not need inpatient hospitalization as per psych. Will need to reconsult if patient continues to refuse po medications

## 2019-07-24 NOTE — PROGRESS NOTE BEHAVIORAL HEALTH - PROBLEM SELECTOR PROBLEM 2
Delirium due to another medical condition, acute, hypoactive
Delirium due to another medical condition, acute, hypoactive

## 2019-07-24 NOTE — PROGRESS NOTE ADULT - ATTENDING COMMENTS
Encouraged compliance with HTN meds  C/w current dose of enalapril, 20mg in AM and 10mg in PM  Apprec Psych recs, will dc zyprexa as that might be causing the AMS-toxic encephalopathy, he seemed to be ok when I saw him earlier  Plan for dc to shelter tomorrow  Rest as above

## 2019-07-24 NOTE — PROGRESS NOTE ADULT - PROBLEM SELECTOR PLAN 6
History of diabetes, as per EMS. Blood glucose range 84- 147. No ketones in urine.  -fingersticks have been 70s-80s over last 48 hours  -HgA1c 4.8  -No current need for fingersticks

## 2019-07-24 NOTE — PROGRESS NOTE BEHAVIORAL HEALTH - PROBLEM SELECTOR PLAN 2
Pt with increased confusion today.   Delirium work up as per primary team  Zyprexa 5 mg qhs
Pt with increased confusion over the past two days.   Consider further delirium work up as pt's MS has worsened over the past two days. (EEG, Head MRI)  Hold Zyprexa to rule it out as a cause of worsening MS

## 2019-07-24 NOTE — PROGRESS NOTE ADULT - PROBLEM SELECTOR PLAN 8
1) PCP Contacted on Admission: (Y/N) --> Name & Phone #: Patient w/o PCP  2) Date of Contact with PCP:  3) PCP Contacted at Discharge: (Y/N)  4) Summary of Handoff Given to PCP:   5) Post-Discharge Appointment Date and Location:
1) PCP Contacted on Admission: (Y/N) --> Name & Phone #: Patient w/o PCP  2) Date of Contact with PCP:  3) PCP Contacted at Discharge: (Y/N)  4) Summary of Handoff Given to PCP:   5) Post-Discharge Appointment Date and Location:

## 2019-07-25 VITALS
RESPIRATION RATE: 18 BRPM | TEMPERATURE: 99 F | HEART RATE: 71 BPM | OXYGEN SATURATION: 99 % | DIASTOLIC BLOOD PRESSURE: 104 MMHG | SYSTOLIC BLOOD PRESSURE: 174 MMHG

## 2019-07-25 PROCEDURE — 99239 HOSP IP/OBS DSCHRG MGMT >30: CPT

## 2019-07-25 RX ORDER — BUPROPION HYDROCHLORIDE 150 MG/1
1 TABLET, EXTENDED RELEASE ORAL
Qty: 0 | Refills: 0 | DISCHARGE
Start: 2019-07-25

## 2019-07-25 RX ORDER — BUPROPION HYDROCHLORIDE 150 MG/1
1 TABLET, EXTENDED RELEASE ORAL
Qty: 60 | Refills: 0
Start: 2019-07-25 | End: 2019-08-23

## 2019-07-25 RX ADMIN — Medication 20 MILLIGRAM(S): at 14:40

## 2019-07-25 NOTE — DISCHARGE NOTE PROVIDER - HOSPITAL COURSE
Patient is 32yo M with past medical history of hypertension (not on home medications) and unspecified psychiatric disorder.    Presented with AMS, found to have hypertensive emergency (-181/112-125) and EMILI (2/2 to hypertension).    Problem List/Main Diagnoses (system-based):     Inpatient treatment course: Patient initially treated in the ED with 50mg hydralazine PO followed by 10mg labetalol. Admitted to [facility] where he was started on enalapril 20mg in the morning and 10mg at night. Blood pressure is well-controlled on this regiment. EMILI resolved with IV fluids. Psychiatry followed patient throughout hospital stay, endorsed pt has history of schizoaffective disorder, and started patient on Zyprexa 2.5mg every night, and Wellbutrin XL 150mg daily. Patient's mental status waxed and waned throughout hospital stay, ranging from conversational to disengaged and non-verbal. Cleared by psychiatry for discharge as he has no indication for inpatient psychiatric hospitalization. Patient transferred to regional medical floors on 7/23 for blood pressure control. Pts blood pressure continued to be well-controlled on enalapril and is medically safe for discharge.    New medications: Zyprexa, Wellbutrin, Enalapril    Labs to be followed outpatient: plasma metanephrines, plasma nometanephrins, plasma renin, plasma aldosterone    Exam to be followed outpatient: none Patient is 34yo M with past medical history of hypertension (not on home medications) and unspecified psychiatric disorder (not on meds).    Presented with AMS, found to have hypertensive emergency (-181/112-125) and EMILI (2/2 to hypertension).    Problem List/Main Diagnoses (system-based):     Inpatient treatment course: Patient initially treated in the ED with 50mg hydralazine PO followed by 10mg labetalol. Admitted to [facility] where he was started on enalapril 20mg in the morning and 10mg at night. Blood pressure is well-controlled on this regiment. EMILI resolved with IV fluids and BP control. Psychiatry followed patient throughout hospital stay, endorsed pt has history of schizoaffective disorder, and started patient on Zyprexa 2.5mg x 1 and Wellbutrin XL 150mg daily. ZYprexa discontinued for concern of making patient's mental status worse. Patient's mental status waxed and waned throughout hospital stay, ranging from conversational to disengaged and non-verbal. Cleared by psychiatry for discharge as he has no indication for involuntary inpatient psychiatric hospitalization and pt not interested in inpatient psych stay. Patient transferred to regional medical floors on 7/23 for blood pressure control. Pts blood pressure continued to be well-controlled on enalapril and is medically safe for discharge.    New medications: Wellbutrin, Enalapril    Labs to be followed outpatient: bmp for creatinine    Exam to be followed outpatient: BP and psychiatric eval

## 2019-07-25 NOTE — DISCHARGE NOTE PROVIDER - NSFOLLOWUPCLINICS_GEN_ALL_ED_FT
[facility]  Family Medicine  [facility]  [place], [place] [facility]  Phone: [phone]  Fax:   Follow Up Time:

## 2019-07-25 NOTE — DISCHARGE NOTE PROVIDER - NSDCQMPCI_CARD_ALL_CORE
No pt c/o R-shoulder pain x 1 year, worse this morning while at gym and "shoulder just gave out, sounded like a tesfaye door sound".  pt was lifting 200-lb free weights this am

## 2019-07-25 NOTE — DISCHARGE NOTE PROVIDER - NSDCFUADDAPPT_GEN_ALL_CORE_FT
Please follow up a primary care physician within 1-2 weeks of discharge. An appointment will be made for you with the [facility]. Please follow up with your primary care physician at [address] within 1-2 weeks of discharge. You can also make an appt with the clinic above if your prefer at [facility]. Please discuss with your primary care doctor about a psych referral.

## 2019-07-25 NOTE — DISCHARGE NOTE NURSING/CASE MANAGEMENT/SOCIAL WORK - NSDCDPATPORTLINK_GEN_ALL_CORE
You can access the Register My InfoÂ®[facility] Patient Portal, offered by [facility], by registering with the following website: [email or web address]

## 2019-07-25 NOTE — DISCHARGE NOTE PROVIDER - NSDCCPCAREPLAN_GEN_ALL_CORE_FT
PRINCIPAL DISCHARGE DIAGNOSIS  Diagnosis: Hypertensive emergency  Assessment and Plan of Treatment: You initially came to the ED due to changes in your mental status. In the ED you were found to have elevated blood pressure, which caused kidney injury. Your blood pressure was controlled using enalapril and has been stable. Please continue to take 20mg enalapril every morning and 10mg enalapril every night for blood pressure control. Please follow up with your medical doctor to ensure adequate blood pressure control.      SECONDARY DISCHARGE DIAGNOSES  Diagnosis: Schizoaffective disorder, unspecified type  Assessment and Plan of Treatment: You have a known history of schizoaffective disorder, for which you were not taking any medications on admission. Psychiatry started you on zyprexa 2.5mg at night and wellbutrin XL 150mg in the morning for treatment of your psychiatric disorder. Please continue to take these medications and follow up with outpatient psychiatry for management.    Diagnosis: EMILI (acute kidney injury)  Assessment and Plan of Treatment: You were found to have kidney injury, likely due to high blood pressure. This resolved with treatment of IV fluids. PRINCIPAL DISCHARGE DIAGNOSIS  Diagnosis: Hypertensive emergency  Assessment and Plan of Treatment: You initially came to the ED due to changes in your mental status. In the ED you were found to have elevated blood pressure, which caused kidney injury. Your blood pressure was controlled using enalapril and has been stable. Please continue to take 20mg enalapril every morning and 10mg enalapril every night for blood pressure control. Please follow up with your medical doctor to ensure adequate blood pressure control.      SECONDARY DISCHARGE DIAGNOSES  Diagnosis: EMILI (acute kidney injury)  Assessment and Plan of Treatment: You were found to have kidney injury, likely due to high blood pressure. This resolved with treatment of IV fluids and control of your blood pressure.    Diagnosis: Schizoaffective disorder, unspecified type  Assessment and Plan of Treatment: You have a known history of schizoaffective disorder, for which you were not taking any medications on admission. Psychiatry started you wellbutrin XL 150mg in the morning for treatment of your psychiatric disorder. Please continue to take these medications and follow up with outpatient psychiatry for management.

## 2019-07-25 NOTE — DISCHARGE NOTE NURSING/CASE MANAGEMENT/SOCIAL WORK - NSDCFUADDAPPT_GEN_ALL_CORE_FT
Please follow up with your primary care physician at [address] within 1-2 weeks of discharge. You can also make an appt with the clinic above if your prefer at [facility]. Please discuss with your primary care doctor about a psych referral.

## 2019-07-29 LAB
METANEPHRINE, PL: 51 PG/ML — SIGNIFICANT CHANGE UP (ref 0–62)
NORMETANEPHRINE, PL: 51 PG/ML — SIGNIFICANT CHANGE UP (ref 0–145)

## 2019-07-30 DIAGNOSIS — H02.401 UNSPECIFIED PTOSIS OF RIGHT EYELID: ICD-10-CM

## 2019-07-30 DIAGNOSIS — F05 DELIRIUM DUE TO KNOWN PHYSIOLOGICAL CONDITION: ICD-10-CM

## 2019-07-30 DIAGNOSIS — F10.10 ALCOHOL ABUSE, UNCOMPLICATED: ICD-10-CM

## 2019-07-30 DIAGNOSIS — I16.1 HYPERTENSIVE EMERGENCY: ICD-10-CM

## 2019-07-30 DIAGNOSIS — N17.9 ACUTE KIDNEY FAILURE, UNSPECIFIED: ICD-10-CM

## 2019-07-30 DIAGNOSIS — G93.41 METABOLIC ENCEPHALOPATHY: ICD-10-CM

## 2019-07-30 DIAGNOSIS — E87.6 HYPOKALEMIA: ICD-10-CM

## 2019-07-30 DIAGNOSIS — F25.9 SCHIZOAFFECTIVE DISORDER, UNSPECIFIED: ICD-10-CM

## 2019-07-30 DIAGNOSIS — Z59.0 HOMELESSNESS: ICD-10-CM

## 2019-07-30 DIAGNOSIS — Z91.14 PATIENT'S OTHER NONCOMPLIANCE WITH MEDICATION REGIMEN: ICD-10-CM

## 2019-07-30 DIAGNOSIS — E11.9 TYPE 2 DIABETES MELLITUS WITHOUT COMPLICATIONS: ICD-10-CM

## 2019-07-30 DIAGNOSIS — I10 ESSENTIAL (PRIMARY) HYPERTENSION: ICD-10-CM

## 2019-07-30 SDOH — ECONOMIC STABILITY - HOUSING INSECURITY: HOMELESSNESS: Z59.0

## 2019-10-01 PROCEDURE — 99285 EMERGENCY DEPT VISIT HI MDM: CPT | Mod: 25

## 2019-10-01 PROCEDURE — 70450 CT HEAD/BRAIN W/O DYE: CPT

## 2019-10-01 PROCEDURE — 84443 ASSAY THYROID STIM HORMONE: CPT

## 2019-10-01 PROCEDURE — 82962 GLUCOSE BLOOD TEST: CPT

## 2019-10-01 PROCEDURE — 85610 PROTHROMBIN TIME: CPT

## 2019-10-01 PROCEDURE — 82803 BLOOD GASES ANY COMBINATION: CPT

## 2019-10-01 PROCEDURE — 80307 DRUG TEST PRSMV CHEM ANLYZR: CPT

## 2019-10-01 PROCEDURE — 85025 COMPLETE CBC W/AUTO DIFF WBC: CPT

## 2019-10-01 PROCEDURE — 85027 COMPLETE CBC AUTOMATED: CPT

## 2019-10-01 PROCEDURE — 71045 X-RAY EXAM CHEST 1 VIEW: CPT

## 2019-10-01 PROCEDURE — 36000 PLACE NEEDLE IN VEIN: CPT | Mod: XU

## 2019-10-01 PROCEDURE — 93306 TTE W/DOPPLER COMPLETE: CPT

## 2019-10-01 PROCEDURE — 93005 ELECTROCARDIOGRAM TRACING: CPT

## 2019-10-01 PROCEDURE — G0378: CPT

## 2019-10-01 PROCEDURE — 96361 HYDRATE IV INFUSION ADD-ON: CPT

## 2019-10-01 PROCEDURE — 96374 THER/PROPH/DIAG INJ IV PUSH: CPT

## 2019-10-01 PROCEDURE — 80048 BASIC METABOLIC PNL TOTAL CA: CPT

## 2019-10-01 PROCEDURE — 83605 ASSAY OF LACTIC ACID: CPT

## 2019-10-01 PROCEDURE — 83835 ASSAY OF METANEPHRINES: CPT

## 2019-10-01 PROCEDURE — 84100 ASSAY OF PHOSPHORUS: CPT

## 2019-10-01 PROCEDURE — 96372 THER/PROPH/DIAG INJ SC/IM: CPT | Mod: XU

## 2019-10-01 PROCEDURE — 36415 COLL VENOUS BLD VENIPUNCTURE: CPT

## 2019-10-01 PROCEDURE — 80076 HEPATIC FUNCTION PANEL: CPT

## 2019-10-01 PROCEDURE — 85730 THROMBOPLASTIN TIME PARTIAL: CPT

## 2019-10-01 PROCEDURE — 82550 ASSAY OF CK (CPK): CPT

## 2019-10-01 PROCEDURE — 80053 COMPREHEN METABOLIC PANEL: CPT

## 2019-10-01 PROCEDURE — 83735 ASSAY OF MAGNESIUM: CPT

## 2019-10-01 PROCEDURE — 84484 ASSAY OF TROPONIN QUANT: CPT

## 2019-10-01 PROCEDURE — 81001 URINALYSIS AUTO W/SCOPE: CPT

## 2019-10-01 PROCEDURE — 83036 HEMOGLOBIN GLYCOSYLATED A1C: CPT

## 2021-04-15 NOTE — PROGRESS NOTE ADULT - PROBLEM SELECTOR PLAN 2
Books:  "How Not To Die" - Tamie Devine MD  "[facility]" - Arianna Garcia    Videos:  "[place]"    Websites:  www nutritionfacts  org  Wellness Visit for Adults   AMBULATORY CARE:   A wellness visit  is when you see your healthcare provider to get screened for health problems  Your healthcare provider will also give you advice on how to stay healthy  Write down your questions so you remember to ask them  Ask your healthcare provider how often you should have a wellness visit  What happens at a wellness visit:  Your healthcare provider will ask about your health, and your family history of health problems  This includes high blood pressure, heart disease, and cancer  He or she will ask if you have symptoms that concern you, if you smoke, and about your mood  You may also be asked about your intake of medicines, supplements, food, and alcohol  Any of the following may be done:  · Your weight  will be checked  Your height may also be checked so your body mass index (BMI) can be calculated  Your BMI shows if you are at a healthy weight  · Your blood pressure  and heart rate will be checked  Your temperature may also be checked  · Blood and urine tests  may be done  Blood tests may be done to check your cholesterol levels  Abnormal cholesterol levels increase your risk for heart disease and stroke  You may also need a blood or urine test to check for diabetes if you are at increased risk  Urine tests may be done to look for signs of an infection or kidney disease  · A physical exam  includes checking your heartbeat and lungs with a stethoscope  Your healthcare provider may also check your skin to look for sun damage  · Screening tests  may be recommended  A screening test is done to check for diseases that may not cause symptoms  The screening tests you may need depend on your age, gender, family history, and lifestyle habits   For example, colorectal screening may be recommended if you are 50 years old or older  Screening tests you need if you are a woman:   · A Pap smear  is used to screen for cervical cancer  Pap smears are usually done every 3 to 5 years depending on your age  You may need them more often if you have had abnormal Pap smear test results in the past  Ask your healthcare provider how often you should have a Pap smear  · A mammogram  is an x-ray of your breasts to screen for breast cancer  Experts recommend mammograms every 2 years starting at age 48 years  You may need a mammogram at age 52 years or younger if you have an increased risk for breast cancer  Talk to your healthcare provider about when you should start having mammograms and how often you need them  Vaccines you may need:   · Get an influenza vaccine  every year  The influenza vaccine protects you from the flu  Several types of viruses cause the flu  The viruses change over time, so new vaccines are made each year  · Get a tetanus-diphtheria (Td) booster vaccine  every 10 years  This vaccine protects you against tetanus and diphtheria  Tetanus is a severe infection that may cause painful muscle spasms and lockjaw  Diphtheria is a severe bacterial infection that causes a thick covering in the back of your mouth and throat  · Get a human papillomavirus (HPV) vaccine  if you are female and aged 23 to 32 or male 23 to 24 and never received it  This vaccine protects you from HPV infection  HPV is the most common infection spread by sexual contact  HPV may also cause vaginal, penile, and anal cancers  · Get a pneumococcal vaccine  if you are aged 72 years or older  The pneumococcal vaccine is an injection given to protect you from pneumococcal disease  Pneumococcal disease is an infection caused by pneumococcal bacteria  The infection may cause pneumonia, meningitis, or an ear infection  · Get a shingles vaccine  if you are 60 or older, even if you have had shingles before   The shingles vaccine is an injection to protect you from the varicella-zoster virus  This is the same virus that causes chickenpox  Shingles is a painful rash that develops in people who had chickenpox or have been exposed to the virus  How to eat healthy:  My Plate is a model for planning healthy meals  It shows the types and amounts of foods that should go on your plate  Fruits and vegetables make up about half of your plate, and grains and protein make up the other half  A serving of dairy is included on the side of your plate  The amount of calories and serving sizes you need depends on your age, gender, weight, and height  Examples of healthy foods are listed below:  · Eat a variety of vegetables  such as dark green, red, and orange vegetables  You can also include canned vegetables low in sodium (salt) and frozen vegetables without added butter or sauces  · Eat a variety of fresh fruits , canned fruit in 100% juice, frozen fruit, and dried fruit  · Include whole grains  At least half of the grains you eat should be whole grains  Examples include whole-wheat bread, wheat pasta, brown rice, and whole-grain cereals such as oatmeal     · Eat a variety of protein foods such as seafood (fish and shellfish), lean meat, and poultry without skin (turkey and chicken)  Examples of lean meats include pork leg, shoulder, or tenderloin, and beef round, sirloin, tenderloin, and extra lean ground beef  Other protein foods include eggs and egg substitutes, beans, peas, soy products, nuts, and seeds  · Choose low-fat dairy products such as skim or 1% milk or low-fat yogurt, cheese, and cottage cheese  · Limit unhealthy fats  such as butter, hard margarine, and shortening  Exercise:  Exercise at least 30 minutes per day on most days of the week  Some examples of exercise include walking, biking, dancing, and swimming  You can also fit in more physical activity by taking the stairs instead of the elevator or parking farther away from stores   Include muscle strengthening activities 2 days each week  Regular exercise provides many health benefits  It helps you manage your weight, and decreases your risk for type 2 diabetes, heart disease, stroke, and high blood pressure  Exercise can also help improve your mood  Ask your healthcare provider about the best exercise plan for you  General health and safety guidelines:   · Do not smoke  Nicotine and other chemicals in cigarettes and cigars can cause lung damage  Ask your healthcare provider for information if you currently smoke and need help to quit  E-cigarettes or smokeless tobacco still contain nicotine  Talk to your healthcare provider before you use these products  · Limit alcohol  A drink of alcohol is 12 ounces of beer, 5 ounces of wine, or 1½ ounces of liquor  · Lose weight, if needed  Being overweight increases your risk of certain health conditions  These include heart disease, high blood pressure, type 2 diabetes, and certain types of cancer  · Protect your skin  Do not sunbathe or use tanning beds  Use sunscreen with a SPF 15 or higher  Apply sunscreen at least 15 minutes before you go outside  Reapply sunscreen every 2 hours  Wear protective clothing, hats, and sunglasses when you are outside  · Drive safely  Always wear your seatbelt  Make sure everyone in your car wears a seatbelt  A seatbelt can save your life if you are in an accident  Do not use your cell phone when you are driving  This could distract you and cause an accident  Pull over if you need to make a call or send a text message  · Practice safe sex  Use latex condoms if are sexually active and have more than one partner  Your healthcare provider may recommend screening tests for sexually transmitted infections (STIs)  · Wear helmets, lifejackets, and protective gear  Always wear a helmet when you ride a bike or motorcycle, go skiing, or play sports that could cause a head injury   Wear protective equipment when you play sports  Wear a lifejacket when you are on a boat or doing water sports  © Copyright [address] Information is for End User's use only and may not be sold, redistributed or otherwise used for commercial purposes  All illustrations and images included in CareNotes® are the copyrighted property of [employer]  or Joyce Alegria   The above information is an  only  It is not intended as medical advice for individual conditions or treatments  Talk to your doctor, nurse or pharmacist before following any medical regimen to see if it is safe and effective for you  Wellness Visit for Adults   AMBULATORY CARE:   A wellness visit  is when you see your healthcare provider to get screened for health problems  Your healthcare provider will also give you advice on how to stay healthy  Write down your questions so you remember to ask them  Ask your healthcare provider how often you should have a wellness visit  What happens at a wellness visit:  Your healthcare provider will ask about your health, and your family history of health problems  This includes high blood pressure, heart disease, and cancer  He or she will ask if you have symptoms that concern you, if you smoke, and about your mood  You may also be asked about your intake of medicines, supplements, food, and alcohol  Any of the following may be done:  · Your weight  will be checked  Your height may also be checked so your body mass index (BMI) can be calculated  Your BMI shows if you are at a healthy weight  · Your blood pressure  and heart rate will be checked  Your temperature may also be checked  · Blood and urine tests  may be done  Blood tests may be done to check your cholesterol levels  Abnormal cholesterol levels increase your risk for heart disease and stroke  You may also need a blood or urine test to check for diabetes if you are at increased risk   Urine tests may be done to look for signs of an infection or kidney disease  · A physical exam  includes checking your heartbeat and lungs with a stethoscope  Your healthcare provider may also check your skin to look for sun damage  · Screening tests  may be recommended  A screening test is done to check for diseases that may not cause symptoms  The screening tests you may need depend on your age, gender, family history, and lifestyle habits  For example, colorectal screening may be recommended if you are 48years old or older  Screening tests you need if you are a woman:   · A Pap smear  is used to screen for cervical cancer  Pap smears are usually done every 3 to 5 years depending on your age  You may need them more often if you have had abnormal Pap smear test results in the past  Ask your healthcare provider how often you should have a Pap smear  · A mammogram  is an x-ray of your breasts to screen for breast cancer  Experts recommend mammograms every 2 years starting at age 48 years  You may need a mammogram at age 52 years or younger if you have an increased risk for breast cancer  Talk to your healthcare provider about when you should start having mammograms and how often you need them  Vaccines you may need:   · Get an influenza vaccine  every year  The influenza vaccine protects you from the flu  Several types of viruses cause the flu  The viruses change over time, so new vaccines are made each year  · Get a tetanus-diphtheria (Td) booster vaccine  every 10 years  This vaccine protects you against tetanus and diphtheria  Tetanus is a severe infection that may cause painful muscle spasms and lockjaw  Diphtheria is a severe bacterial infection that causes a thick covering in the back of your mouth and throat  · Get a human papillomavirus (HPV) vaccine  if you are female and aged 23 to 32 or male 23 to 24 and never received it  This vaccine protects you from HPV infection  HPV is the most common infection spread by sexual contact   HPV may also cause vaginal, penile, and anal cancers  · Get a pneumococcal vaccine  if you are aged 72 years or older  The pneumococcal vaccine is an injection given to protect you from pneumococcal disease  Pneumococcal disease is an infection caused by pneumococcal bacteria  The infection may cause pneumonia, meningitis, or an ear infection  · Get a shingles vaccine  if you are 60 or older, even if you have had shingles before  The shingles vaccine is an injection to protect you from the varicella-zoster virus  This is the same virus that causes chickenpox  Shingles is a painful rash that develops in people who had chickenpox or have been exposed to the virus  How to eat healthy:  My Plate is a model for planning healthy meals  It shows the types and amounts of foods that should go on your plate  Fruits and vegetables make up about half of your plate, and grains and protein make up the other half  A serving of dairy is included on the side of your plate  The amount of calories and serving sizes you need depends on your age, gender, weight, and height  Examples of healthy foods are listed below:  · Eat a variety of vegetables  such as dark green, red, and orange vegetables  You can also include canned vegetables low in sodium (salt) and frozen vegetables without added butter or sauces  · Eat a variety of fresh fruits , canned fruit in 100% juice, frozen fruit, and dried fruit  · Include whole grains  At least half of the grains you eat should be whole grains  Examples include whole-wheat bread, wheat pasta, brown rice, and whole-grain cereals such as oatmeal     · Eat a variety of protein foods such as seafood (fish and shellfish), lean meat, and poultry without skin (turkey and chicken)  Examples of lean meats include pork leg, shoulder, or tenderloin, and beef round, sirloin, tenderloin, and extra lean ground beef   Other protein foods include eggs and egg substitutes, beans, peas, soy products, nuts, and seeds     · Choose low-fat dairy products such as skim or 1% milk or low-fat yogurt, cheese, and cottage cheese  · Limit unhealthy fats  such as butter, hard margarine, and shortening  Exercise:  Exercise at least 30 minutes per day on most days of the week  Some examples of exercise include walking, biking, dancing, and swimming  You can also fit in more physical activity by taking the stairs instead of the elevator or parking farther away from stores  Include muscle strengthening activities 2 days each week  Regular exercise provides many health benefits  It helps you manage your weight, and decreases your risk for type 2 diabetes, heart disease, stroke, and high blood pressure  Exercise can also help improve your mood  Ask your healthcare provider about the best exercise plan for you  General health and safety guidelines:   · Do not smoke  Nicotine and other chemicals in cigarettes and cigars can cause lung damage  Ask your healthcare provider for information if you currently smoke and need help to quit  E-cigarettes or smokeless tobacco still contain nicotine  Talk to your healthcare provider before you use these products  · Limit alcohol  A drink of alcohol is 12 ounces of beer, 5 ounces of wine, or 1½ ounces of liquor  · Lose weight, if needed  Being overweight increases your risk of certain health conditions  These include heart disease, high blood pressure, type 2 diabetes, and certain types of cancer  · Protect your skin  Do not sunbathe or use tanning beds  Use sunscreen with a SPF 15 or higher  Apply sunscreen at least 15 minutes before you go outside  Reapply sunscreen every 2 hours  Wear protective clothing, hats, and sunglasses when you are outside  · Drive safely  Always wear your seatbelt  Make sure everyone in your car wears a seatbelt  A seatbelt can save your life if you are in an accident  Do not use your cell phone when you are driving   This could distract you and cause an accident  Pull over if you need to make a call or send a text message  · Practice safe sex  Use latex condoms if are sexually active and have more than one partner  Your healthcare provider may recommend screening tests for sexually transmitted infections (STIs)  · Wear helmets, lifejackets, and protective gear  Always wear a helmet when you ride a bike or motorcycle, go skiing, or play sports that could cause a head injury  Wear protective equipment when you play sports  Wear a lifejacket when you are on a boat or doing water sports  © Copyright [address] Information is for End User's use only and may not be sold, redistributed or otherwise used for commercial purposes  All illustrations and images included in CareNotes® are the copyrighted property of [employer]  or [facility] Lois Alegria   The above information is an  only  It is not intended as medical advice for individual conditions or treatments  Talk to your doctor, nurse or pharmacist before following any medical regimen to see if it is safe and effective for you  Wellness Visit for Adults   AMBULATORY CARE:   A wellness visit  is when you see your healthcare provider to get screened for health problems  Your healthcare provider will also give you advice on how to stay healthy  Write down your questions so you remember to ask them  Ask your healthcare provider how often you should have a wellness visit  What happens at a wellness visit:  Your healthcare provider will ask about your health, and your family history of health problems  This includes high blood pressure, heart disease, and cancer  He or she will ask if you have symptoms that concern you, if you smoke, and about your mood  You may also be asked about your intake of medicines, supplements, food, and alcohol  Any of the following may be done:  · Your weight  will be checked   Your height may also be checked so your body mass index (BMI) can be calculated  Your BMI shows if you are at a healthy weight  · Your blood pressure  and heart rate will be checked  Your temperature may also be checked  · Blood and urine tests  may be done  Blood tests may be done to check your cholesterol levels  Abnormal cholesterol levels increase your risk for heart disease and stroke  You may also need a blood or urine test to check for diabetes if you are at increased risk  Urine tests may be done to look for signs of an infection or kidney disease  · A physical exam  includes checking your heartbeat and lungs with a stethoscope  Your healthcare provider may also check your skin to look for sun damage  · Screening tests  may be recommended  A screening test is done to check for diseases that may not cause symptoms  The screening tests you may need depend on your age, gender, family history, and lifestyle habits  For example, colorectal screening may be recommended if you are 48years old or older  Screening tests you need if you are a woman:   · A Pap smear  is used to screen for cervical cancer  Pap smears are usually done every 3 to 5 years depending on your age  You may need them more often if you have had abnormal Pap smear test results in the past  Ask your healthcare provider how often you should have a Pap smear  · A mammogram  is an x-ray of your breasts to screen for breast cancer  Experts recommend mammograms every 2 years starting at age 48 years  You may need a mammogram at age 52 years or younger if you have an increased risk for breast cancer  Talk to your healthcare provider about when you should start having mammograms and how often you need them  Vaccines you may need:   · Get an influenza vaccine  every year  The influenza vaccine protects you from the flu  Several types of viruses cause the flu  The viruses change over time, so new vaccines are made each year  · Get a tetanus-diphtheria (Td) booster vaccine  every 10 years   This vaccine protects you against tetanus and diphtheria  Tetanus is a severe infection that may cause painful muscle spasms and lockjaw  Diphtheria is a severe bacterial infection that causes a thick covering in the back of your mouth and throat  · Get a human papillomavirus (HPV) vaccine  if you are female and aged 23 to 32 or male 23 to 24 and never received it  This vaccine protects you from HPV infection  HPV is the most common infection spread by sexual contact  HPV may also cause vaginal, penile, and anal cancers  · Get a pneumococcal vaccine  if you are aged 72 years or older  The pneumococcal vaccine is an injection given to protect you from pneumococcal disease  Pneumococcal disease is an infection caused by pneumococcal bacteria  The infection may cause pneumonia, meningitis, or an ear infection  · Get a shingles vaccine  if you are 60 or older, even if you have had shingles before  The shingles vaccine is an injection to protect you from the varicella-zoster virus  This is the same virus that causes chickenpox  Shingles is a painful rash that develops in people who had chickenpox or have been exposed to the virus  How to eat healthy:  My Plate is a model for planning healthy meals  It shows the types and amounts of foods that should go on your plate  Fruits and vegetables make up about half of your plate, and grains and protein make up the other half  A serving of dairy is included on the side of your plate  The amount of calories and serving sizes you need depends on your age, gender, weight, and height  Examples of healthy foods are listed below:  · Eat a variety of vegetables  such as dark green, red, and orange vegetables  You can also include canned vegetables low in sodium (salt) and frozen vegetables without added butter or sauces  · Eat a variety of fresh fruits , canned fruit in 100% juice, frozen fruit, and dried fruit  · Include whole grains    At least half of the grains you eat should be whole grains  Examples include whole-wheat bread, wheat pasta, brown rice, and whole-grain cereals such as oatmeal     · Eat a variety of protein foods such as seafood (fish and shellfish), lean meat, and poultry without skin (turkey and chicken)  Examples of lean meats include pork leg, shoulder, or tenderloin, and beef round, sirloin, tenderloin, and extra lean ground beef  Other protein foods include eggs and egg substitutes, beans, peas, soy products, nuts, and seeds  · Choose low-fat dairy products such as skim or 1% milk or low-fat yogurt, cheese, and cottage cheese  · Limit unhealthy fats  such as butter, hard margarine, and shortening  Exercise:  Exercise at least 30 minutes per day on most days of the week  Some examples of exercise include walking, biking, dancing, and swimming  You can also fit in more physical activity by taking the stairs instead of the elevator or parking farther away from stores  Include muscle strengthening activities 2 days each week  Regular exercise provides many health benefits  It helps you manage your weight, and decreases your risk for type 2 diabetes, heart disease, stroke, and high blood pressure  Exercise can also help improve your mood  Ask your healthcare provider about the best exercise plan for you  General health and safety guidelines:   · Do not smoke  Nicotine and other chemicals in cigarettes and cigars can cause lung damage  Ask your healthcare provider for information if you currently smoke and need help to quit  E-cigarettes or smokeless tobacco still contain nicotine  Talk to your healthcare provider before you use these products  · Limit alcohol  A drink of alcohol is 12 ounces of beer, 5 ounces of wine, or 1½ ounces of liquor  · Lose weight, if needed  Being overweight increases your risk of certain health conditions  These include heart disease, high blood pressure, type 2 diabetes, and certain types of cancer      · Protect your skin  Do not sunbathe or use tanning beds  Use sunscreen with a SPF 15 or higher  Apply sunscreen at least 15 minutes before you go outside  Reapply sunscreen every 2 hours  Wear protective clothing, hats, and sunglasses when you are outside  · Drive safely  Always wear your seatbelt  Make sure everyone in your car wears a seatbelt  A seatbelt can save your life if you are in an accident  Do not use your cell phone when you are driving  This could distract you and cause an accident  Pull over if you need to make a call or send a text message  · Practice safe sex  Use latex condoms if are sexually active and have more than one partner  Your healthcare provider may recommend screening tests for sexually transmitted infections (STIs)  · Wear helmets, lifejackets, and protective gear  Always wear a helmet when you ride a bike or motorcycle, go skiing, or play sports that could cause a head injury  Wear protective equipment when you play sports  Wear a lifejacket when you are on a boat or doing water sports  © Copyright [address] Information is for End User's use only and may not be sold, redistributed or otherwise used for commercial purposes  All illustrations and images included in CareNotes® are the copyrighted property of A D A M , [employer]  or [facility] Lois Johnson  The above information is an  only  It is not intended as medical advice for individual conditions or treatments  Talk to your doctor, nurse or pharmacist before following any medical regimen to see if it is safe and effective for you  Patient presented with ptosis on right side with mild right facial droop. Patient does not know if how long he has had this facial droop for. Concern patient may have had a stroke in his past or have had another neurological process  -consulted neurology for utility of MRI.  -Neurology appreciated R ptosis, however endorses that it is possibly pt's baseline. No focal deficits appreciated. Endorse no need for MRI.

## 2021-11-05 NOTE — PROGRESS NOTE BEHAVIORAL HEALTH - ATTENTION / CONCENTRATION
Impaired
PAST MEDICAL HISTORY:  Descending aortic aneurysm     Hypertension     Mitral valve regurgitation     
Impaired
Impaired

## 2023-05-18 ENCOUNTER — OFFICE (OUTPATIENT)
Dept: URBAN - METROPOLITAN AREA CLINIC 76 | Facility: CLINIC | Age: 38
Setting detail: OPHTHALMOLOGY
End: 2023-05-18
Payer: COMMERCIAL

## 2023-05-18 DIAGNOSIS — H53.011: ICD-10-CM

## 2023-05-18 DIAGNOSIS — Q10.0: ICD-10-CM

## 2023-05-18 PROCEDURE — 92014 COMPRE OPH EXAM EST PT 1/>: CPT | Performed by: OPHTHALMOLOGY

## 2023-05-18 ASSESSMENT — REFRACTION_MANIFEST
OD_CYLINDER: -1.00
OS_CYLINDER: SPH
OD_CYLINDER: -1.00
OD_SPHERE: -2.00
OS_SPHERE: -2.00
OS_CYLINDER: -0.25
OD_AXIS: 90
OS_SPHERE: -2.25
OD_AXIS: 78
OS_VA1: 20/20
OS_VA1: 20/20
OD_VA1: 20/40
OD_SPHERE: -1.50
OD_VA1: 20/20
OS_AXIS: 167

## 2023-05-18 ASSESSMENT — CONFRONTATIONAL VISUAL FIELD TEST (CVF)
OS_FINDINGS: FULL
OD_FINDINGS: FULL

## 2023-05-18 ASSESSMENT — REFRACTION_AUTOREFRACTION
OD_AXIS: 85
OS_AXIS: 36
OS_CYLINDER: -0.25
OD_CYLINDER: -1.25
OD_SPHERE: -2.00
OS_SPHERE: -2.25

## 2023-05-18 ASSESSMENT — VISUAL ACUITY
OD_BCVA: 20/80-2
OS_BCVA: 20/70-2

## 2023-05-18 ASSESSMENT — KERATOMETRY
OD_AXISANGLE_DEGREES: 164
OS_AXISANGLE_DEGREES: 102
OD_K1POWER_DIOPTERS: 43.50
OS_K2POWER_DIOPTERS: 44.75
OD_K2POWER_DIOPTERS: 44.50
OS_K1POWER_DIOPTERS: 44.25

## 2023-05-18 ASSESSMENT — AXIALLENGTH_DERIVED
OD_AL: 24.4619
OD_AL: 24.4097
OS_AL: 24.1634
OD_AL: 24.2029
OS_AL: 24.1634

## 2023-05-18 ASSESSMENT — SPHEQUIV_DERIVED
OS_SPHEQUIV: -2.375
OD_SPHEQUIV: -2.625
OD_SPHEQUIV: -2
OS_SPHEQUIV: -2.375
OD_SPHEQUIV: -2.5

## 2023-05-18 ASSESSMENT — LID POSITION - PTOSIS: OD_PTOSIS: RUL 2+

## 2024-06-13 NOTE — ED ADULT NURSE NOTE - NS ED NURSE RECORD ANOTHER VITAL SIGN
My chart message has been sent to the patient for PATIENT R[place] with [facility] [place]    Yes

## 2024-09-13 NOTE — ED PROVIDER NOTE - PR
Pt called requesting a refill for following medication    HYDROcodone-acetaminophen (NORCO) 5-325 MG per tablet    124

## 2025-07-18 NOTE — BEHAVIORAL HEALTH ASSESSMENT NOTE - NS ED BHA MSE SPEECH VOLUME
nephrology ([facility]) Progress Note    Patient:  Daljit Elizondo  YOB: 1959  Date of Service: 7/18/2025  MRN: [id number]   Acct: [id number]   Primary Care Physician: Jessie Helms APRN - CNP  Advance Directive: Full Code  Admit Date: 5/14/2025       Hospital Day: 65  Referring Provider: Cody Howell MD    Patient independently seen and examined, Chart, Consults, Notes, Operative notes, Labs, Cardiology, and Radiology studies reviewed as available.    Chief complaint: Abnormal labs.    Subjective:  Daljit Elizondo is a 66 y.o. male for whom we were consulted for evaluation and treatment of acute kidney injury.  Patient denies any history of chronic kidney disease.  He has been in the hospital for the last month, poor historian and has never seen nephrology in the past.  Patient has severe abdominal obesity, hypertension, sleep apnea, type 2 diabetes, chronic diastolic CHF, sleep apnea.  Presented with increasing shortness of breath/dyspnea on exertion.  Hospital course remarkable for treatment with intravenous diuretics for the treatment of volume overload, CHF exacerbation.  His serum creatinine was 0.9 mg with estimated GFR more than 90 mL on admission.  Patient has significant drop in GFR and nephrology is consulted.  He was initially treated with IV fluid with minimal to no improvement of renal function.  Patient now agreed to proceed with dialysis if needed.  I have consulted vascular surgery, unfortunately permacatheter cannot be done here as he is much heavier for operating room table in this institute.    This morning's patient is lying flat, denies any shortness of breath.  He has good oxygenation and urine output.  He is responding maintenance dose of loop diuretics.    Allergies:  Penicillamine, Silvadene [silver sulfadiazine], Aerohist [chlorpheniramine-methscop er], Cephalosporins, Hemp seed oil, Neosporin [neomycin-polymyxin-gramicidin], Penicillins, and    15:16     XR CHEST PORTABLE  Result Date: 6/27/2025  EXAM: CHEST RADIOGRAPH  TECHNIQUE: Single frontal chest radiograph.  HISTORY: Shortness of air  COMPARISON: 05/19/2025  FINDINGS:  Patient is rotated to the left. Continued low lung volumes cardiomegaly and central pulmonary vascular congestion with diffuse increased interstitial markings and alveolar opacities. Limited diagnostic sensitivity due to rotation, body habitus sand technique. Continued suggestion of left lower lobe consolidation. No definite pneumothorax.      1.  Continued low lung volumes, cardiomegaly, central pulmonary vascular congestion and interstitial edema consistent with congestive failure or fluid overload. 2. Left basilar consolidation and probable dependent left pleural effusion.    ______________________________________ Electronically signed by: SHANNAN SAUCEDA M.D. Date:     06/27/2025 Time:    10:41        Assessment   1.  Acute kidney injury/stable  2.  Falsely low Fena/acute tubular necrosis.  3.  Severe abdominal obesity.  4.  Acute on chronic diastolic CHF.  5.  Obstructive sleep apnea syndrome.  6.  Type 2 diabetes/poorly controlled  7.  Clinically volume overloaded.      Plan:  1.  Patient has nonoliguric acute tubular necrosis, responding well to loop diuretics.  At this point he has borderline renal function and had no improvement of renal function.  2.  No indication for dialysis at this time.  Although he has been maintaining borderline renal function.  3.  Continue to monitor renal function closely.      Jose Kamara MD  07/18/25  12:01 PM   Soft